# Patient Record
Sex: FEMALE | Race: WHITE | NOT HISPANIC OR LATINO | Employment: PART TIME | URBAN - METROPOLITAN AREA
[De-identification: names, ages, dates, MRNs, and addresses within clinical notes are randomized per-mention and may not be internally consistent; named-entity substitution may affect disease eponyms.]

---

## 2017-01-05 ENCOUNTER — HOSPITAL ENCOUNTER (OUTPATIENT)
Dept: MAMMOGRAPHY | Facility: HOSPITAL | Age: 57
Discharge: HOME | End: 2017-01-05
Attending: FAMILY MEDICINE

## 2017-01-05 ENCOUNTER — COMMUNICATION - HEALTHEAST (OUTPATIENT)
Dept: FAMILY MEDICINE | Facility: CLINIC | Age: 57
End: 2017-01-05

## 2017-01-05 DIAGNOSIS — Z12.31 VISIT FOR SCREENING MAMMOGRAM: ICD-10-CM

## 2017-01-05 DIAGNOSIS — G43.909 MIGRAINE: ICD-10-CM

## 2017-01-18 ENCOUNTER — RECORDS - HEALTHEAST (OUTPATIENT)
Dept: ADMINISTRATIVE | Facility: OTHER | Age: 57
End: 2017-01-18

## 2017-01-20 ENCOUNTER — RECORDS - HEALTHEAST (OUTPATIENT)
Dept: ADMINISTRATIVE | Facility: OTHER | Age: 57
End: 2017-01-20

## 2017-01-20 ENCOUNTER — RECORDS - HEALTHEAST (OUTPATIENT)
Dept: BONE DENSITY | Facility: CLINIC | Age: 57
End: 2017-01-20

## 2017-01-20 DIAGNOSIS — Z78.0 ASYMPTOMATIC MENOPAUSAL STATE: ICD-10-CM

## 2017-01-20 DIAGNOSIS — E07.9 DISORDER OF THYROID, UNSPECIFIED: ICD-10-CM

## 2017-03-10 ENCOUNTER — COMMUNICATION - HEALTHEAST (OUTPATIENT)
Dept: FAMILY MEDICINE | Facility: CLINIC | Age: 57
End: 2017-03-10

## 2017-03-10 DIAGNOSIS — E03.9 HYPOTHYROIDISM: ICD-10-CM

## 2017-05-02 ENCOUNTER — RECORDS - HEALTHEAST (OUTPATIENT)
Dept: ADMINISTRATIVE | Facility: OTHER | Age: 57
End: 2017-05-02

## 2017-08-07 ENCOUNTER — COMMUNICATION - HEALTHEAST (OUTPATIENT)
Dept: FAMILY MEDICINE | Facility: CLINIC | Age: 57
End: 2017-08-07

## 2017-08-24 ENCOUNTER — RECORDS - HEALTHEAST (OUTPATIENT)
Dept: ADMINISTRATIVE | Facility: OTHER | Age: 57
End: 2017-08-24

## 2017-11-19 ENCOUNTER — COMMUNICATION - HEALTHEAST (OUTPATIENT)
Dept: FAMILY MEDICINE | Facility: CLINIC | Age: 57
End: 2017-11-19

## 2017-11-19 DIAGNOSIS — E03.9 HYPOTHYROIDISM: ICD-10-CM

## 2017-11-21 ENCOUNTER — COMMUNICATION - HEALTHEAST (OUTPATIENT)
Dept: FAMILY MEDICINE | Facility: CLINIC | Age: 57
End: 2017-11-21

## 2017-12-21 ENCOUNTER — OFFICE VISIT - HEALTHEAST (OUTPATIENT)
Dept: FAMILY MEDICINE | Facility: CLINIC | Age: 57
End: 2017-12-21

## 2017-12-21 DIAGNOSIS — E04.2 NONTOXIC MULTINODULAR GOITER: ICD-10-CM

## 2017-12-21 DIAGNOSIS — Z00.00 ROUTINE GENERAL MEDICAL EXAMINATION AT A HEALTH CARE FACILITY: ICD-10-CM

## 2017-12-21 DIAGNOSIS — E55.9 VITAMIN D DEFICIENCY: ICD-10-CM

## 2017-12-21 DIAGNOSIS — G43.909 MIGRAINES: ICD-10-CM

## 2017-12-21 DIAGNOSIS — E80.6 DISORDER OF BILIRUBIN EXCRETION: ICD-10-CM

## 2017-12-21 DIAGNOSIS — E06.3 HASHIMOTO'S DISEASE: ICD-10-CM

## 2017-12-21 LAB
CHOLEST SERPL-MCNC: 235 MG/DL
FASTING STATUS PATIENT QL REPORTED: YES
HDLC SERPL-MCNC: 106 MG/DL
LDLC SERPL CALC-MCNC: 119 MG/DL
TRIGL SERPL-MCNC: 52 MG/DL

## 2017-12-21 ASSESSMENT — MIFFLIN-ST. JEOR: SCORE: 1119.29

## 2018-01-02 ENCOUNTER — COMMUNICATION - HEALTHEAST (OUTPATIENT)
Dept: FAMILY MEDICINE | Facility: CLINIC | Age: 58
End: 2018-01-02

## 2018-02-02 ENCOUNTER — COMMUNICATION - HEALTHEAST (OUTPATIENT)
Dept: FAMILY MEDICINE | Facility: CLINIC | Age: 58
End: 2018-02-02

## 2018-02-02 ENCOUNTER — HOSPITAL ENCOUNTER (OUTPATIENT)
Dept: MAMMOGRAPHY | Facility: HOSPITAL | Age: 58
Discharge: HOME | End: 2018-02-02
Attending: FAMILY MEDICINE

## 2018-02-02 DIAGNOSIS — G43.909 MIGRAINE: ICD-10-CM

## 2018-02-02 DIAGNOSIS — Z12.31 VISIT FOR SCREENING MAMMOGRAM: ICD-10-CM

## 2018-03-05 ENCOUNTER — COMMUNICATION - HEALTHEAST (OUTPATIENT)
Dept: FAMILY MEDICINE | Facility: CLINIC | Age: 58
End: 2018-03-05

## 2018-03-05 DIAGNOSIS — E03.9 HYPOTHYROIDISM: ICD-10-CM

## 2018-11-27 ENCOUNTER — COMMUNICATION - HEALTHEAST (OUTPATIENT)
Dept: FAMILY MEDICINE | Facility: CLINIC | Age: 58
End: 2018-11-27

## 2018-11-27 DIAGNOSIS — E03.9 HYPOTHYROIDISM: ICD-10-CM

## 2019-01-16 ENCOUNTER — OFFICE VISIT - HEALTHEAST (OUTPATIENT)
Dept: FAMILY MEDICINE | Facility: CLINIC | Age: 59
End: 2019-01-16

## 2019-01-16 DIAGNOSIS — E55.9 VITAMIN D DEFICIENCY: ICD-10-CM

## 2019-01-16 DIAGNOSIS — E04.2 NONTOXIC MULTINODULAR GOITER: ICD-10-CM

## 2019-01-16 DIAGNOSIS — E03.9 HYPOTHYROIDISM: ICD-10-CM

## 2019-01-16 DIAGNOSIS — G43.909 MIGRAINE WITHOUT STATUS MIGRAINOSUS, NOT INTRACTABLE, UNSPECIFIED MIGRAINE TYPE: ICD-10-CM

## 2019-01-16 DIAGNOSIS — Z00.00 ROUTINE GENERAL MEDICAL EXAMINATION AT A HEALTH CARE FACILITY: ICD-10-CM

## 2019-01-16 DIAGNOSIS — E80.6 DISORDER OF BILIRUBIN EXCRETION: ICD-10-CM

## 2019-01-16 LAB
CHOLEST SERPL-MCNC: 224 MG/DL
FASTING STATUS PATIENT QL REPORTED: YES
FASTING STATUS PATIENT QL REPORTED: YES
GLUCOSE BLD-MCNC: 87 MG/DL (ref 70–125)
HDLC SERPL-MCNC: 111 MG/DL
LDLC SERPL CALC-MCNC: 100 MG/DL
TRIGL SERPL-MCNC: 64 MG/DL
TSH SERPL DL<=0.005 MIU/L-ACNC: 0.64 UIU/ML (ref 0.3–5)

## 2019-01-17 LAB
25(OH)D3 SERPL-MCNC: 39.3 NG/ML (ref 30–80)
25(OH)D3 SERPL-MCNC: 39.3 NG/ML (ref 30–80)

## 2019-01-24 ENCOUNTER — COMMUNICATION - HEALTHEAST (OUTPATIENT)
Dept: FAMILY MEDICINE | Facility: CLINIC | Age: 59
End: 2019-01-24

## 2019-02-14 ENCOUNTER — HOSPITAL ENCOUNTER (OUTPATIENT)
Dept: MAMMOGRAPHY | Facility: CLINIC | Age: 59
Discharge: HOME | End: 2019-02-14
Attending: FAMILY MEDICINE

## 2019-02-14 DIAGNOSIS — Z12.31 VISIT FOR SCREENING MAMMOGRAM: ICD-10-CM

## 2019-02-18 ENCOUNTER — COMMUNICATION - HEALTHEAST (OUTPATIENT)
Dept: FAMILY MEDICINE | Facility: CLINIC | Age: 59
End: 2019-02-18

## 2019-02-18 DIAGNOSIS — G43.909 MIGRAINE: ICD-10-CM

## 2019-09-10 ENCOUNTER — COMMUNICATION - HEALTHEAST (OUTPATIENT)
Dept: SCHEDULING | Facility: CLINIC | Age: 59
End: 2019-09-10

## 2019-09-10 ENCOUNTER — OFFICE VISIT - HEALTHEAST (OUTPATIENT)
Dept: FAMILY MEDICINE | Facility: CLINIC | Age: 59
End: 2019-09-10

## 2019-09-10 DIAGNOSIS — R42 LIGHTHEADEDNESS: ICD-10-CM

## 2019-09-10 DIAGNOSIS — R06.09 DOE (DYSPNEA ON EXERTION): ICD-10-CM

## 2019-09-10 DIAGNOSIS — R42 DIZZINESS: ICD-10-CM

## 2019-09-10 DIAGNOSIS — E03.9 HYPOTHYROIDISM, UNSPECIFIED TYPE: ICD-10-CM

## 2019-09-10 DIAGNOSIS — R06.09 OTHER FORMS OF DYSPNEA: ICD-10-CM

## 2019-09-10 DIAGNOSIS — G62.9 PERIPHERAL POLYNEUROPATHY: ICD-10-CM

## 2019-09-10 DIAGNOSIS — D64.9 ANEMIA, UNSPECIFIED TYPE: ICD-10-CM

## 2019-09-10 LAB
ALBUMIN SERPL-MCNC: 4.2 G/DL (ref 3.5–5)
ALP SERPL-CCNC: 77 U/L (ref 45–120)
ALT SERPL W P-5'-P-CCNC: <9 U/L (ref 0–45)
ANION GAP SERPL CALCULATED.3IONS-SCNC: 9 MMOL/L (ref 5–18)
AST SERPL W P-5'-P-CCNC: 15 U/L (ref 0–40)
BASOPHILS # BLD AUTO: 0 THOU/UL (ref 0–0.2)
BASOPHILS NFR BLD AUTO: 1 % (ref 0–2)
BILIRUB SERPL-MCNC: 0.9 MG/DL (ref 0–1)
BUN SERPL-MCNC: 14 MG/DL (ref 8–22)
CALCIUM SERPL-MCNC: 9.5 MG/DL (ref 8.5–10.5)
CHLORIDE BLD-SCNC: 103 MMOL/L (ref 98–107)
CO2 SERPL-SCNC: 26 MMOL/L (ref 22–31)
CREAT SERPL-MCNC: 0.84 MG/DL (ref 0.6–1.1)
EOSINOPHIL # BLD AUTO: 0.3 THOU/UL (ref 0–0.4)
EOSINOPHIL NFR BLD AUTO: 4 % (ref 0–6)
ERYTHROCYTE [DISTWIDTH] IN BLOOD BY AUTOMATED COUNT: 13.7 % (ref 11–14.5)
GFR SERPL CREATININE-BSD FRML MDRD: >60 ML/MIN/1.73M2
GLUCOSE BLD-MCNC: 98 MG/DL (ref 70–125)
HCT VFR BLD AUTO: 32.9 % (ref 35–47)
HGB BLD-MCNC: 11.1 G/DL (ref 12–16)
LYMPHOCYTES # BLD AUTO: 2.6 THOU/UL (ref 0.8–4.4)
LYMPHOCYTES NFR BLD AUTO: 35 % (ref 20–40)
MCH RBC QN AUTO: 27.1 PG (ref 27–34)
MCHC RBC AUTO-ENTMCNC: 33.8 G/DL (ref 32–36)
MCV RBC AUTO: 80 FL (ref 80–100)
MONOCYTES # BLD AUTO: 0.4 THOU/UL (ref 0–0.9)
MONOCYTES NFR BLD AUTO: 6 % (ref 2–10)
NEUTROPHILS # BLD AUTO: 4 THOU/UL (ref 2–7.7)
NEUTROPHILS NFR BLD AUTO: 55 % (ref 50–70)
PLATELET # BLD AUTO: 237 THOU/UL (ref 140–440)
PMV BLD AUTO: 7.9 FL (ref 7–10)
POTASSIUM BLD-SCNC: 3.8 MMOL/L (ref 3.5–5)
PROT SERPL-MCNC: 7.2 G/DL (ref 6–8)
RBC # BLD AUTO: 4.11 MILL/UL (ref 3.8–5.4)
SODIUM SERPL-SCNC: 138 MMOL/L (ref 136–145)
TSH SERPL DL<=0.005 MIU/L-ACNC: 1.28 UIU/ML (ref 0.3–5)
VIT B12 SERPL-MCNC: 440 PG/ML (ref 213–816)
WBC: 7.2 THOU/UL (ref 4–11)

## 2019-09-11 LAB
ATRIAL RATE - MUSE: 60 BPM
DIASTOLIC BLOOD PRESSURE - MUSE: NORMAL MMHG
FERRITIN SERPL-MCNC: 4 NG/ML (ref 10–130)
INTERPRETATION ECG - MUSE: NORMAL
IRON SATN MFR SERPL: 5 % (ref 20–50)
IRON SERPL-MCNC: 20 UG/DL (ref 42–175)
P AXIS - MUSE: 32 DEGREES
PR INTERVAL - MUSE: 168 MS
QRS DURATION - MUSE: 80 MS
QT - MUSE: 446 MS
QTC - MUSE: 446 MS
R AXIS - MUSE: 19 DEGREES
SYSTOLIC BLOOD PRESSURE - MUSE: NORMAL MMHG
T AXIS - MUSE: 24 DEGREES
TIBC SERPL-MCNC: 415 UG/DL (ref 313–563)
TRANSFERRIN SERPL-MCNC: 332 MG/DL (ref 212–360)
VENTRICULAR RATE- MUSE: 60 BPM

## 2019-09-20 ENCOUNTER — AMBULATORY - HEALTHEAST (OUTPATIENT)
Dept: FAMILY MEDICINE | Facility: CLINIC | Age: 59
End: 2019-09-20

## 2019-09-20 DIAGNOSIS — D50.9 IRON DEFICIENCY ANEMIA, UNSPECIFIED IRON DEFICIENCY ANEMIA TYPE: ICD-10-CM

## 2019-09-20 RX ORDER — FERROUS SULFATE 325(65) MG
1 TABLET ORAL 2 TIMES DAILY
Qty: 60 TABLET | Refills: 3 | Status: SHIPPED | OUTPATIENT
Start: 2019-09-20 | End: 2023-03-29

## 2019-10-01 ENCOUNTER — COMMUNICATION - HEALTHEAST (OUTPATIENT)
Dept: FAMILY MEDICINE | Facility: CLINIC | Age: 59
End: 2019-10-01

## 2019-10-13 ENCOUNTER — COMMUNICATION - HEALTHEAST (OUTPATIENT)
Dept: FAMILY MEDICINE | Facility: CLINIC | Age: 59
End: 2019-10-13

## 2020-01-11 ENCOUNTER — COMMUNICATION - HEALTHEAST (OUTPATIENT)
Dept: FAMILY MEDICINE | Facility: CLINIC | Age: 60
End: 2020-01-11

## 2020-01-11 DIAGNOSIS — E03.9 HYPOTHYROIDISM: ICD-10-CM

## 2020-02-05 ENCOUNTER — RECORDS - HEALTHEAST (OUTPATIENT)
Dept: ADMINISTRATIVE | Facility: OTHER | Age: 60
End: 2020-02-05

## 2020-02-05 ENCOUNTER — OFFICE VISIT - HEALTHEAST (OUTPATIENT)
Dept: FAMILY MEDICINE | Facility: CLINIC | Age: 60
End: 2020-02-05

## 2020-02-05 ENCOUNTER — TRANSFERRED RECORDS (OUTPATIENT)
Dept: MULTI SPECIALTY CLINIC | Facility: CLINIC | Age: 60
End: 2020-02-05

## 2020-02-05 DIAGNOSIS — E06.3 HYPOTHYROIDISM DUE TO HASHIMOTO'S THYROIDITIS: ICD-10-CM

## 2020-02-05 DIAGNOSIS — D50.8 OTHER IRON DEFICIENCY ANEMIA: ICD-10-CM

## 2020-02-05 DIAGNOSIS — E55.9 VITAMIN D DEFICIENCY: ICD-10-CM

## 2020-02-05 DIAGNOSIS — E04.2 NONTOXIC MULTINODULAR GOITER: ICD-10-CM

## 2020-02-05 DIAGNOSIS — Z00.00 ROUTINE GENERAL MEDICAL EXAMINATION AT A HEALTH CARE FACILITY: ICD-10-CM

## 2020-02-05 DIAGNOSIS — G43.909 MIGRAINE WITHOUT STATUS MIGRAINOSUS, NOT INTRACTABLE, UNSPECIFIED MIGRAINE TYPE: ICD-10-CM

## 2020-02-05 DIAGNOSIS — Z85.828 HISTORY OF BASAL CELL CARCINOMA: ICD-10-CM

## 2020-02-05 LAB
CHOLEST SERPL-MCNC: 225 MG/DL
ERYTHROCYTE [DISTWIDTH] IN BLOOD BY AUTOMATED COUNT: 12.3 % (ref 11–14.5)
FASTING STATUS PATIENT QL REPORTED: YES
FASTING STATUS PATIENT QL REPORTED: YES
FERRITIN SERPL-MCNC: 45 NG/ML (ref 10–130)
GLUCOSE BLD-MCNC: 81 MG/DL (ref 70–125)
HCT VFR BLD AUTO: 41 % (ref 35–47)
HDLC SERPL-MCNC: 83 MG/DL
HGB BLD-MCNC: 14.2 G/DL (ref 12–16)
HIV 1+2 AB+HIV1 P24 AG SERPL QL IA: NEGATIVE
HPV ABSTRACT: NORMAL
LDLC SERPL CALC-MCNC: 124 MG/DL
MCH RBC QN AUTO: 30.1 PG (ref 27–34)
MCHC RBC AUTO-ENTMCNC: 34.7 G/DL (ref 32–36)
MCV RBC AUTO: 87 FL (ref 80–100)
PLATELET # BLD AUTO: 205 THOU/UL (ref 140–440)
PMV BLD AUTO: 8.2 FL (ref 7–10)
RBC # BLD AUTO: 4.72 MILL/UL (ref 3.8–5.4)
TRIGL SERPL-MCNC: 88 MG/DL
TSH SERPL DL<=0.005 MIU/L-ACNC: 0.9 UIU/ML (ref 0.3–5)
WBC: 9.4 THOU/UL (ref 4–11)

## 2020-02-05 ASSESSMENT — MIFFLIN-ST. JEOR: SCORE: 1115.66

## 2020-02-06 LAB
25(OH)D3 SERPL-MCNC: 41.1 NG/ML (ref 30–80)
25(OH)D3 SERPL-MCNC: 41.1 NG/ML (ref 30–80)
HCV AB SERPL QL IA: NEGATIVE

## 2020-02-21 ENCOUNTER — RECORDS - HEALTHEAST (OUTPATIENT)
Dept: ADMINISTRATIVE | Facility: OTHER | Age: 60
End: 2020-02-21

## 2020-03-09 ENCOUNTER — RECORDS - HEALTHEAST (OUTPATIENT)
Dept: HEALTH INFORMATION MANAGEMENT | Facility: CLINIC | Age: 60
End: 2020-03-09

## 2020-06-23 ENCOUNTER — COMMUNICATION - HEALTHEAST (OUTPATIENT)
Dept: FAMILY MEDICINE | Facility: CLINIC | Age: 60
End: 2020-06-23

## 2020-06-23 DIAGNOSIS — G43.909 MIGRAINE: ICD-10-CM

## 2020-06-23 RX ORDER — RIZATRIPTAN BENZOATE 10 MG/1
TABLET ORAL
Qty: 12 TABLET | Refills: 3 | Status: SHIPPED | OUTPATIENT
Start: 2020-06-23 | End: 2021-07-30

## 2020-08-13 ENCOUNTER — OFFICE VISIT - HEALTHEAST (OUTPATIENT)
Dept: FAMILY MEDICINE | Facility: CLINIC | Age: 60
End: 2020-08-13

## 2020-08-13 ENCOUNTER — COMMUNICATION - HEALTHEAST (OUTPATIENT)
Dept: FAMILY MEDICINE | Facility: CLINIC | Age: 60
End: 2020-08-13

## 2020-08-13 DIAGNOSIS — T63.441A BEE STING REACTION, ACCIDENTAL OR UNINTENTIONAL, INITIAL ENCOUNTER: ICD-10-CM

## 2020-08-13 RX ORDER — PREDNISONE 20 MG/1
TABLET ORAL
Qty: 7 TABLET | Refills: 0 | Status: SHIPPED | OUTPATIENT
Start: 2020-08-13 | End: 2021-08-19

## 2021-02-09 ENCOUNTER — RECORDS - HEALTHEAST (OUTPATIENT)
Dept: ADMINISTRATIVE | Facility: OTHER | Age: 61
End: 2021-02-09

## 2021-02-09 ENCOUNTER — COMMUNICATION - HEALTHEAST (OUTPATIENT)
Dept: ADMINISTRATIVE | Facility: CLINIC | Age: 61
End: 2021-02-09

## 2021-02-11 ENCOUNTER — TRANSFERRED RECORDS (OUTPATIENT)
Dept: MULTI SPECIALTY CLINIC | Facility: CLINIC | Age: 61
End: 2021-02-11

## 2021-02-11 LAB — PAP SMEAR - HIM PATIENT REPORTED: NEGATIVE

## 2021-02-17 ENCOUNTER — OFFICE VISIT - HEALTHEAST (OUTPATIENT)
Dept: FAMILY MEDICINE | Facility: CLINIC | Age: 61
End: 2021-02-17

## 2021-02-17 DIAGNOSIS — E06.3 HYPOTHYROIDISM DUE TO HASHIMOTO'S THYROIDITIS: ICD-10-CM

## 2021-02-17 DIAGNOSIS — Z85.828 HISTORY OF BASAL CELL CARCINOMA: ICD-10-CM

## 2021-02-17 DIAGNOSIS — G43.909 MIGRAINE WITHOUT STATUS MIGRAINOSUS, NOT INTRACTABLE, UNSPECIFIED MIGRAINE TYPE: ICD-10-CM

## 2021-02-17 DIAGNOSIS — D50.8 OTHER IRON DEFICIENCY ANEMIA: ICD-10-CM

## 2021-02-17 DIAGNOSIS — E80.6 DISORDER OF BILIRUBIN EXCRETION: ICD-10-CM

## 2021-02-17 DIAGNOSIS — E55.9 VITAMIN D DEFICIENCY: ICD-10-CM

## 2021-02-17 DIAGNOSIS — E04.2 NONTOXIC MULTINODULAR GOITER: ICD-10-CM

## 2021-02-17 DIAGNOSIS — Z12.11 SCREEN FOR COLON CANCER: ICD-10-CM

## 2021-02-17 DIAGNOSIS — M85.88 OSTEOPENIA OF SPINE: ICD-10-CM

## 2021-02-17 DIAGNOSIS — Z00.00 ROUTINE GENERAL MEDICAL EXAMINATION AT A HEALTH CARE FACILITY: ICD-10-CM

## 2021-02-17 LAB
ALBUMIN SERPL-MCNC: 4.2 G/DL (ref 3.5–5)
ANION GAP SERPL CALCULATED.3IONS-SCNC: 8 MMOL/L (ref 5–18)
BUN SERPL-MCNC: 13 MG/DL (ref 8–22)
CALCIUM SERPL-MCNC: 9.1 MG/DL (ref 8.5–10.5)
CHLORIDE BLD-SCNC: 106 MMOL/L (ref 98–107)
CHOLEST SERPL-MCNC: 209 MG/DL
CO2 SERPL-SCNC: 26 MMOL/L (ref 22–31)
CREAT SERPL-MCNC: 0.79 MG/DL (ref 0.6–1.1)
ERYTHROCYTE [DISTWIDTH] IN BLOOD BY AUTOMATED COUNT: 12.8 % (ref 11–14.5)
FASTING STATUS PATIENT QL REPORTED: YES
FERRITIN SERPL-MCNC: 79 NG/ML (ref 10–130)
GFR SERPL CREATININE-BSD FRML MDRD: >60 ML/MIN/1.73M2
GLUCOSE BLD-MCNC: 93 MG/DL (ref 70–125)
HCT VFR BLD AUTO: 40.8 % (ref 35–47)
HDLC SERPL-MCNC: 83 MG/DL
HGB BLD-MCNC: 13.6 G/DL (ref 12–16)
LDLC SERPL CALC-MCNC: 109 MG/DL
MCH RBC QN AUTO: 29.4 PG (ref 27–34)
MCHC RBC AUTO-ENTMCNC: 33.3 G/DL (ref 32–36)
MCV RBC AUTO: 88 FL (ref 80–100)
PHOSPHATE SERPL-MCNC: 3.3 MG/DL (ref 2.5–4.5)
PLATELET # BLD AUTO: 213 THOU/UL (ref 140–440)
PMV BLD AUTO: 9.4 FL (ref 7–10)
POTASSIUM BLD-SCNC: 4.5 MMOL/L (ref 3.5–5)
PTH-INTACT SERPL-MCNC: 44 PG/ML (ref 10–86)
RBC # BLD AUTO: 4.62 MILL/UL (ref 3.8–5.4)
SODIUM SERPL-SCNC: 140 MMOL/L (ref 136–145)
TRIGL SERPL-MCNC: 83 MG/DL
TSH SERPL DL<=0.005 MIU/L-ACNC: 2.69 UIU/ML (ref 0.3–5)
WBC: 5.6 THOU/UL (ref 4–11)

## 2021-02-17 ASSESSMENT — MIFFLIN-ST. JEOR: SCORE: 1119.52

## 2021-02-18 LAB
25(OH)D3 SERPL-MCNC: 58.7 NG/ML (ref 30–80)
25(OH)D3 SERPL-MCNC: 58.7 NG/ML (ref 30–80)

## 2021-02-19 ENCOUNTER — RECORDS - HEALTHEAST (OUTPATIENT)
Dept: ADMINISTRATIVE | Facility: OTHER | Age: 61
End: 2021-02-19

## 2021-02-22 ENCOUNTER — AMBULATORY - HEALTHEAST (OUTPATIENT)
Dept: MULTI SPECIALTY CLINIC | Facility: CLINIC | Age: 61
End: 2021-02-22

## 2021-02-22 LAB — COLOGUARD-ABSTRACT: NEGATIVE

## 2021-03-12 ENCOUNTER — COMMUNICATION - HEALTHEAST (OUTPATIENT)
Dept: FAMILY MEDICINE | Facility: CLINIC | Age: 61
End: 2021-03-12

## 2021-04-14 ENCOUNTER — COMMUNICATION - HEALTHEAST (OUTPATIENT)
Dept: FAMILY MEDICINE | Facility: CLINIC | Age: 61
End: 2021-04-14

## 2021-04-14 DIAGNOSIS — E06.3 HYPOTHYROIDISM DUE TO HASHIMOTO'S THYROIDITIS: ICD-10-CM

## 2021-04-16 RX ORDER — LEVOTHYROXINE SODIUM 75 UG/1
75 TABLET ORAL DAILY
Qty: 90 TABLET | Refills: 3 | Status: SHIPPED | OUTPATIENT
Start: 2021-04-16 | End: 2022-03-21

## 2021-05-24 ENCOUNTER — RECORDS - HEALTHEAST (OUTPATIENT)
Dept: ADMINISTRATIVE | Facility: CLINIC | Age: 61
End: 2021-05-24

## 2021-05-25 ENCOUNTER — RECORDS - HEALTHEAST (OUTPATIENT)
Dept: ADMINISTRATIVE | Facility: CLINIC | Age: 61
End: 2021-05-25

## 2021-05-26 ENCOUNTER — RECORDS - HEALTHEAST (OUTPATIENT)
Dept: ADMINISTRATIVE | Facility: CLINIC | Age: 61
End: 2021-05-26

## 2021-05-27 ENCOUNTER — RECORDS - HEALTHEAST (OUTPATIENT)
Dept: ADMINISTRATIVE | Facility: CLINIC | Age: 61
End: 2021-05-27

## 2021-05-28 ENCOUNTER — RECORDS - HEALTHEAST (OUTPATIENT)
Dept: ADMINISTRATIVE | Facility: CLINIC | Age: 61
End: 2021-05-28

## 2021-05-29 ENCOUNTER — RECORDS - HEALTHEAST (OUTPATIENT)
Dept: ADMINISTRATIVE | Facility: CLINIC | Age: 61
End: 2021-05-29

## 2021-05-31 ENCOUNTER — RECORDS - HEALTHEAST (OUTPATIENT)
Dept: ADMINISTRATIVE | Facility: CLINIC | Age: 61
End: 2021-05-31

## 2021-05-31 VITALS — HEIGHT: 63 IN | BODY MASS INDEX: 22.47 KG/M2 | WEIGHT: 126.8 LBS

## 2021-06-01 ENCOUNTER — RECORDS - HEALTHEAST (OUTPATIENT)
Dept: ADMINISTRATIVE | Facility: CLINIC | Age: 61
End: 2021-06-01

## 2021-06-01 ENCOUNTER — COMMUNICATION - HEALTHEAST (OUTPATIENT)
Dept: FAMILY MEDICINE | Facility: CLINIC | Age: 61
End: 2021-06-01

## 2021-06-01 DIAGNOSIS — T75.3XXA MOTION SICKNESS, INITIAL ENCOUNTER: ICD-10-CM

## 2021-06-01 NOTE — TELEPHONE ENCOUNTER
Having light headedness, shortness of breath going up stairs, and both arms tingling into hands (ring and pinky finger).  Has been  Under a lot of stress.  Not normal for her.    Denies chest pain but has been doing weights and has soreness from that.  Heavy feeling for a while.    Does not feel like she will pass out.    Has issues with low BP.    Will drink at least a glass of water to help BP.    Patient would like to be seen.    Transferred to scheduling for an appointment for today.    Chanelle Farah, RN, [facility] Nurse Triage/Med Refills RN     Reason for Disposition    Patient wants to be seen    Protocols used: DIZZINESS-A-OH

## 2021-06-01 NOTE — PROGRESS NOTES
Patient ID: Bee Schulz is a 59 y.o. female.  /62   Pulse 71   Wt 125 lb 3.2 oz (56.8 kg)   LMP 12/23/2012   SpO2 97%   BMI 22.18 kg/m      Assessment/Plan:                   Diagnoses and all orders for this visit:    Dizziness  -     Ferritin  -     Iron and Transferrin Iron Binding Capacity    RODRIGUEZ (dyspnea on exertion)  -     Comprehensive Metabolic Panel  -     HM1(CBC and Differential)  -     Electrocardiogram Perform and Read  -     XR Chest 2 Views  -     HM1 (CBC with Diff)  -     Ferritin  -     Iron and Transferrin Iron Binding Capacity    Lightheadedness  -     Comprehensive Metabolic Panel  -     HM1(CBC and Differential)  -     Electrocardiogram Perform and Read  -     HM1 (CBC with Diff)  -     Ferritin  -     Iron and Transferrin Iron Binding Capacity    Peripheral polyneuropathy  -     Vitamin B12  -     Ferritin  -     Iron and Transferrin Iron Binding Capacity    Hypothyroidism, unspecified type  -     Thyroid Cascade  -     Ferritin  -     Iron and Transferrin Iron Binding Capacity    Anemia, unspecified type  -     Cancel: Iron and Transferrin Iron Binding Capacity  -     Cancel: Ferritin  -     Ferritin  -     Iron and Transferrin Iron Binding Capacity    Other orders  -     Cancel: Electrocardiogram Perform and Read           DISCUSSION  Test performed include chest x-ray and EKG.  Chest x-ray shows no abnormal any.  EKG shows normal sinus rhythm without any ischemic concerns.  Hemogram shows a mild need discussed obtaining some additional labs and making further recommendations based upon that.  Discussed finding ways to manage stress as well as maintaining hydration.  Likely patient will need additional testing and perhaps further exploration of some of her symptoms which potentially could be related to stress but certainly based on description could be related to multiple other aspects of pathology.  In terms of severe concerns such as coronary vascular disease, neurovascular  disease or other more significant things based on her clinical description, my exam findings on these preliminary tests that were obtained today I do not feel that there is a significant life-threatening process.  Subjective:     GLENYS Schulz is a 59 y.o. female to discuss a multitude of symptoms.  Patient reports that she has felt somewhat dizzy on and off for the past 2 weeks but it seems to be worsening.  She has a hard time describing this dizziness.  It is not clearly vertiginous sensation or a presyncopal type sensation.  In addition patient reports tingling.  This occurs in the arms primarily.  She also has some sensation in the feet and legs.  This is intermittent.  It involves both sides of her body.  Upon further questioning she also reports that she has shortness of breath with activity.  This is somewhat nonspecific.  She denies chest pain associated with activity.  Patient does admit to being under a significant amount of stress.  Patient does have underlying hypothyroidism.    Review of Systems  Complete review of systems is obtained.  Other than the specific considerations noted above complete review of systems is negative.          Objective:   Medications:  Current Outpatient Medications   Medication Sig     levothyroxine (SYNTHROID, LEVOTHROID) 75 MCG tablet Take 1 tablet (75 mcg total) by mouth Daily at 6:00 am.     aspirin-acetaminophen-caffeine (EXCEDRIN MIGRAINE) 250-250-65 mg per tablet Take 1 tablet by mouth every 6 (six) hours as needed for pain.     calcium carbonate (OS-CRAIG) 600 mg (1,500 mg) tablet Take 600 mg by mouth daily. Plus D 400mg     cholecalciferol, vitamin D3, (VITAMIN D3) 2,000 unit cap Take 2,000 Units by mouth daily.     glucosamine-chondroitin 500-400 mg tablet Take 1 tablet by mouth 3 (three) times a day.     magnesium 30 mg tablet Take 250 mg by mouth daily.     naproxen (NAPROSYN) 500 MG tablet Take 500 mg by mouth 2 (two) times a day with meals.      OMEGA-3/DHA/EPA/FISH OIL (FISH OIL-OMEGA-3 FATTY ACIDS) 300-1,000 mg capsule Take 1,200 mg by mouth daily. Takes when needed     rizatriptan (MAXALT) 10 MG tablet Take 1 tablet (10mg) by mouth at headache onset, may repeat in 2 (two) hours if needed (max 30mg/24hrs)       Allergies:  Allergies   Allergen Reactions     Penicillins      Sulfa (Sulfonamide Antibiotics) Rash       Tobacco:   reports that she has quit smoking. She has never used smokeless tobacco.     Physical Exam          /62   Pulse 71   Wt 125 lb 3.2 oz (56.8 kg)   LMP 12/23/2012   SpO2 97%   BMI 22.18 kg/m          General Appearance:    Alert, cooperative, no distress   Eyes:   No scleral icterus or conjunctival irritation       Ears:    Normal TM's and external ear canals, both ears   Throat:   Lips, mucosa, and tongue normal; teeth and gums normal   Neck:   Supple, symmetrical, trachea midline, no adenopathy;        thyroid:  No enlargement/tenderness/nodules   Lungs:     Clear to auscultation bilaterally, respirations unlabored, no wheezes or crackles   Heart:    Regular rate and rhythm,  No murmur   Abdomen:    Soft, no distention, no tenderness on palpation, no masses, no organomegaly     Extremities:  No edema, no joint swelling or redness, no evidence of any injuries   Skin:  No concerning skin findings, no suspicious moles, no rashes   Neurologic:  On gross examination there is no motor or sensory deficit.  Patient walks with a normal gait

## 2021-06-02 ENCOUNTER — RECORDS - HEALTHEAST (OUTPATIENT)
Dept: ADMINISTRATIVE | Facility: CLINIC | Age: 61
End: 2021-06-02

## 2021-06-03 VITALS
HEART RATE: 71 BPM | BODY MASS INDEX: 22.18 KG/M2 | WEIGHT: 125.2 LBS | DIASTOLIC BLOOD PRESSURE: 62 MMHG | OXYGEN SATURATION: 97 % | SYSTOLIC BLOOD PRESSURE: 114 MMHG

## 2021-06-04 VITALS
WEIGHT: 127 LBS | HEART RATE: 68 BPM | OXYGEN SATURATION: 97 % | SYSTOLIC BLOOD PRESSURE: 105 MMHG | BODY MASS INDEX: 22.5 KG/M2 | DIASTOLIC BLOOD PRESSURE: 68 MMHG | RESPIRATION RATE: 16 BRPM

## 2021-06-04 VITALS
WEIGHT: 126 LBS | HEART RATE: 65 BPM | SYSTOLIC BLOOD PRESSURE: 98 MMHG | DIASTOLIC BLOOD PRESSURE: 62 MMHG | BODY MASS INDEX: 22.32 KG/M2 | HEIGHT: 63 IN | OXYGEN SATURATION: 99 %

## 2021-06-05 VITALS
OXYGEN SATURATION: 99 % | BODY MASS INDEX: 22.79 KG/M2 | TEMPERATURE: 98 F | DIASTOLIC BLOOD PRESSURE: 64 MMHG | WEIGHT: 128.6 LBS | RESPIRATION RATE: 20 BRPM | HEIGHT: 63 IN | SYSTOLIC BLOOD PRESSURE: 104 MMHG | HEART RATE: 67 BPM

## 2021-06-05 NOTE — PATIENT INSTRUCTIONS - HE
Consider Shingrix (the new shingles vaccine, 2 dose series) to reduce your risk of shingles.  Check your insurance coverage first.

## 2021-06-05 NOTE — TELEPHONE ENCOUNTER
Refill Approved    Rx renewed per Medication Renewal Policy. Medication was last renewed on 1/16/19.    Jania Venegas, [facility] Triage/Med Refill 1/14/2020     Requested Prescriptions   Pending Prescriptions Disp Refills     levothyroxine (SYNTHROID, LEVOTHROID) 75 MCG tablet [Pharmacy Med Name: LEVOTHYROXINE 0.075MG (75MCG) TABS] 90 tablet 3     Sig: TAKE 1 TABLET BY MOUTH DAILY AT 6AM.       Thyroid Hormones Protocol Passed - 1/11/2020  1:01 PM        Passed - Provider visit in past 12 months or next 3 months     Last office visit with prescriber/PCP: Visit date not found OR same dept: 9/10/2019 David Alarcon MD OR same specialty: 9/10/2019 David Alarcon MD  Last physical: 1/16/2019 Last MTM visit: Visit date not found   Next visit within 3 mo: Visit date not found  Next physical within 3 mo: Visit date not found  Prescriber OR PCP: Katina Alicia MD  Last diagnosis associated with med order: 1. Hypothyroidism  - levothyroxine (SYNTHROID, LEVOTHROID) 75 MCG tablet [Pharmacy Med Name: LEVOTHYROXINE 0.075MG (75MCG) TABS]; TAKE 1 TABLET BY MOUTH DAILY AT 6AM.  Dispense: 90 tablet; Refill: 3    If protocol passes may refill for 12 months if within 3 months of last provider visit (or a total of 15 months).             Passed - TSH on file in past 12 months for patient age 12 & older     TSH   Date Value Ref Range Status   09/10/2019 1.28 0.30 - 5.00 uIU/mL Final

## 2021-06-05 NOTE — PROGRESS NOTES
Assessment/Plan:     1. Routine general medical examination at a health care facility  Encouraged healthy lifestyle habits including regular exercise, healthy eating habits, and adequate calcium and vitamin D intake.  Will obtain fasting glucose to screen for diabetes.  Will obtain fasting lipids to screen for dyslipidemia.  Will check HIV and hepatitis C for infection screening purposes, she is low risk for both.  Release of information obtained for records from [facility] in regards to Pap smear obtained just today.  Up-to-date with mammography and colon cancer screening.  - Lipid Cascade FASTING  - HIV Antigen/Antibody Screening Cascade  - Hepatitis C Antibody (Anti-HCV)  - Glucose    2. Hypothyroidism due to Hashimoto's thyroiditis  Clinically euthyroid.  Will check thyroid cascade today.  Continue levothyroxine.  - Thyroid Cascade  - levothyroxine (SYNTHROID, LEVOTHROID) 75 MCG tablet; Take 1 tablet (75 mcg total) by mouth Daily at 6:00 am.  Dispense: 90 tablet; Refill: 4    3. Nontoxic multinodular goiter  Remains asymptomatic.    4. Vitamin D deficiency  She remains on a supplement.  Will check vitamin D level today.  - Vitamin D, Total (25-Hydroxy)    5. Migraine without status migrainosus, not intractable, unspecified migraine type  Chronic, stable on rizatriptan.    6. History of basal cell carcinoma  She is followed by dermatology.  Using sunscreen regularly.    7. Other iron deficiency anemia  This is related to frequent donation of blood.  We will check hemogram today.  If normal, she may continue to donate blood.  If she develops any symptoms with blood donation this time, then I recommend discontinuation in the future.  Will check ferritin level to assess iron stores as well.  Advised that she continue taking a daily iron supplement if she is going to continue donating blood.  - HM2(CBC w/o Differential)  - Ferritin     Patient Instructions   Consider Shingrix (the new shingles vaccine,  2 dose series) to reduce your risk of shingles.  Check your insurance coverage first.       Return in about 1 year (around 2/5/2021).          Subjective:     Bee Schulz is a 59 y.o. female who presents for an annual exam.  Overall has been doing well.  She was seen by her OB gynecologist at [facility] this morning, they evaluated some right breast pain and she will be having a mammogram and ultrasound to assess further.  She declines further evaluation of this today.  History of hypothyroidism due to Hashimoto's, remains on levothyroxine and feels in good balance.  History of nontoxic goiter, prior ultrasound several years ago showing all lesions less than 1 cm in size, this remains asymptomatic.  History of vitamin D deficiency this due for follow-up.  History of migraine headaches, these are stable.  Using Excedrin or rizatriptan as needed.  Seeing a dermatologist regularly due to previous history of basal cell carcinoma.    In the fall, she was seen by Dr. Alarcon due to dizziness and exertional shortness of breath.  Evaluation unremarkable with the exception of iron deficiency anemia.  Patient had just donated blood.  She began an iron supplement, able to tolerate only once daily due to constipation, also improving her diet especially 10.  Her symptoms have resolved.  Wondering about resuming blood donation, she is scheduled to do so in 2 days.    She is , 3 children.  Working as an .  Exercising regularly and feeling well with this.  Up to 8 alcoholic beverages per week, often none.  She does not smoke.    Healthy Habits:   Healthy Diet: Yes  Regular Exercise: Yes  Sunscreen Use: Yes  Dental Visits Regularly: Yes  Seat Belt: Yes  Domestic abuse:   No    Health Maintenance reviewed:  Lipid Profile: Due today  Glucose Screen: Due today  Colonoscopy: 9/12/2011, follow-up 10 years  Mammogram: 2/14/2019    Gynecologic History  Patient's last menstrual period was  12/23/2012.  Contraception: post menopausal status  Last Pap: Today. Results were: Records requested from [facility], all normal        Immunization History   Administered Date(s) Administered     DT (pediatric) 10/23/2003     Influenza, inj, historic,unspecified 10/20/2007, 11/06/2008, 10/05/2016     Influenza, seasonal,quad inj 6-35 mos 09/26/2009, 10/21/2010, 11/09/2011, 10/26/2012, 10/17/2013     Influenza,seasonal quad, PF, =/> 6months 10/08/2016, 10/12/2017     Influenza,seasonal, Inj IIV3 11/19/2005, 10/28/2006, 10/20/2007, 11/06/2008, 09/26/2009, 10/21/2010, 11/09/2011, 10/26/2012, 10/17/2013     Influenza,seasonal,quad inj =/> 6months 10/21/2017, 10/25/2018, 11/04/2019     Td, Adult, Absorbed 10/23/2003     Tdap 06/14/2012     Immunization status: up to date and documented.    Current Outpatient Medications   Medication Sig Dispense Refill     aspirin-acetaminophen-caffeine (EXCEDRIN MIGRAINE) 250-250-65 mg per tablet Take 1 tablet by mouth every 6 (six) hours as needed for pain.       calcium carbonate (OS-CRAIG) 600 mg (1,500 mg) tablet Take 600 mg by mouth daily. Plus D 400mg       cholecalciferol, vitamin D3, (VITAMIN D3) 2,000 unit cap Take 2,000 Units by mouth daily.       ferrous sulfate 325 (65 FE) MG tablet Take 1 tablet (325 mg total) by mouth 2 (two) times a day. 60 tablet 3     glucosamine-chondroitin 500-400 mg tablet Take 1 tablet by mouth 3 (three) times a day.       levothyroxine (SYNTHROID, LEVOTHROID) 75 MCG tablet Take 1 tablet (75 mcg total) by mouth Daily at 6:00 am. 90 tablet 4     magnesium 30 mg tablet Take 250 mg by mouth daily.       naproxen (NAPROSYN) 500 MG tablet Take 500 mg by mouth 2 (two) times a day with meals.       OMEGA-3/DHA/EPA/FISH OIL (FISH OIL-OMEGA-3 FATTY ACIDS) 300-1,000 mg capsule Take 1,200 mg by mouth daily. Takes when needed       rizatriptan (MAXALT) 10 MG tablet Take 1 tablet (10mg) by mouth at headache onset, may repeat in 2 (two) hours if needed  (max 30mg/24hrs) 12 tablet 3     No current facility-administered medications for this visit.      History reviewed. No pertinent past medical history.  History reviewed. No pertinent surgical history.  Penicillins and Sulfa (sulfonamide antibiotics)  Family History   Problem Relation Age of Onset     Cancer Mother         skin cancer     Rheum arthritis Mother      Cancer Father         Lymphoma     Crohn's disease Father      Heart disease Maternal Grandfather      Heart disease Paternal Grandmother      Diabetes Paternal Grandmother      Cancer Paternal Grandmother         oral/throat     Hypertension Paternal Grandmother      Breast cancer Neg Hx      Social History     Socioeconomic History     Marital status:      Spouse name: Not on file     Number of children: Not on file     Years of education: Not on file     Highest education level: Not on file   Occupational History     Not on file   Social Needs     Financial resource strain: Not on file     Food insecurity:     Worry: Not on file     Inability: Not on file     Transportation needs:     Medical: Not on file     Non-medical: Not on file   Tobacco Use     Smoking status: Former Smoker     Smokeless tobacco: Never Used   Substance and Sexual Activity     Alcohol use: Not on file     Drug use: Not on file     Sexual activity: Not on file   Lifestyle     Physical activity:     Days per week: Not on file     Minutes per session: Not on file     Stress: Not on file   Relationships     Social connections:     Talks on phone: Not on file     Gets together: Not on file     Attends Sikh service: Not on file     Active member of club or organization: Not on file     Attends meetings of clubs or organizations: Not on file     Relationship status: Not on file     Intimate partner violence:     Fear of current or ex partner: Not on file     Emotionally abused: Not on file     Physically abused: Not on file     Forced sexual activity: Not on file   Other  "Topics Concern     Not on file   Social History Narrative     Not on file       Review of Systems  General:  Denies problem  Eyes: Denies problem  Ears/Nose/Throat: Denies problem  Cardiovascular: Denies problem  Respiratory:  Denies problem  Gastrointestinal:  Denies problem   Genitourinary: Denies problem  Musculoskeletal:  Denies problem  Skin: Denies problem  Neurologic: Denies problem  Psychiatric: Denies problem  Endocrine: Denies problem  Heme/Lymphatic: Denies problem   Allergic/Immunologic: Denies problem            Objective:        Vitals:    02/05/20 0900   BP: 98/62   Pulse: 65   SpO2: 99%   Weight: 126 lb (57.2 kg)   Height: 5' 3\" (1.6 m)     Body mass index is 22.32 kg/m .    Physical Exam:  General Appearance: Alert, pleasant, appears stated age  Head: Normocephalic, without obvious abnormality  Eyes: PERRL, conjunctiva/corneas clear, EOM's intact  Ears: Normal TM's and external ear canals, both ears  Nose: Nares normal, septum midline,mucosa normal, no drainage  Throat: Lips, mucosa, and tongue normal; teeth and gums normal; oropharynx is clear  Neck: Supple,without lymphadenopathy or thyromegally  Lungs: Clear to auscultation bilaterally, respirations unlabored  Heart: Regular rate and rhythm, no murmur   Breast: Declines, performed this morning by gynecologist  Abdomen: Soft, non-tender, no masses, no organomegaly  Extremities: Extremities with strong and symmetric pulses, no cyanosis or edema  Skin: Skin color, texture normal, no rashes or lesions  Neurologic: Normal   Pelvic exam: Not indicated               This note has been dictated using voice recognition software. Any grammatical or context distortions are unintentional and inherent to the the software.   "

## 2021-06-09 NOTE — TELEPHONE ENCOUNTER
Refill Approved    Rx renewed per Medication Renewal Policy. Medication was last renewed on 2/20/2019.    Leta Emery, [facility] Triage/Med Refill 6/23/2020     Requested Prescriptions   Pending Prescriptions Disp Refills     rizatriptan (MAXALT) 10 MG tablet [Pharmacy Med Name: RIZATRIPTAN 10MG TABLETS] 12 tablet 3     Sig: TAKE 1 TABLET BY MOUTH AT HEADACHE ONSET. MAY REPEAT IN 2 HOURS AS NEEDED. MAXIMUM OF 3 TABLETS PER 24 HOURS       Triptans Refill Protocol Passed - 6/23/2020  8:22 AM        Passed - PCP or prescribing provider visit in past 12 months       Last office visit with prescriber/PCP: Visit date not found OR same dept: 9/10/2019 David Alarcon MD OR same specialty: 9/10/2019 David Alarcon MD  Last physical: 2/5/2020 Last MTM visit: Visit date not found   Next visit within 3 mo: Visit date not found  Next physical within 3 mo: Visit date not found  Prescriber OR PCP: Katina Alicia MD  Last diagnosis associated with med order: 1. Migraine  - rizatriptan (MAXALT) 10 MG tablet [Pharmacy Med Name: RIZATRIPTAN 10MG TABLETS]; TAKE 1 TABLET BY MOUTH AT HEADACHE ONSET. MAY REPEAT IN 2 HOURS AS NEEDED. MAXIMUM OF 3 TABLETS PER 24 HOURS  Dispense: 12 tablet; Refill: 3    If protocol passes may refill for 12 months if within 3 months of last provider visit (or a total of 15 months).

## 2021-06-10 ENCOUNTER — RECORDS - HEALTHEAST (OUTPATIENT)
Dept: HEALTH INFORMATION MANAGEMENT | Facility: CLINIC | Age: 61
End: 2021-06-10

## 2021-06-10 NOTE — PATIENT INSTRUCTIONS - HE
Avoid scratching  Ice packs may help  Elevate foot  Benadryl 25-50mg every 6-8 hours for itch and swelling  May switch to zyrtec or equivalent daily for up to 2 weeks to be less sedating than benadryl - but benadryl will work faster  Prednisone 20mg daily for 5 days, if no improvement may take one day of 40mg  To ER if shortness of breath, CP, tongue swelling, wheezing, severe pain in leg, fever   Otherwise recheck with us or primary clinic if no improvement in 2-3 days, sooner if worse

## 2021-06-10 NOTE — PROGRESS NOTES
Assessment/Plan:   Bee sting reaction, accidental or unintentional, initial encounter  Bee sting left anterior knee 1 week ago with a local reaction initially around the knee and upper leg - red tender and itchy, swollen. That resolved within several days but then she had itching in that area last night and scratched it vigorously and today there is swelling from knee down to toes, itchy in that entire area, mild tenderness and faintly red. No systemic sxs to suggest diffuse reaction or anaphylaxis. Tired topical benadryl but no oral antihistamines.   Consider reactivation or delayed large local inflammatory reaction to the bee sting. No wound or retained stinger apparent.   Consider histamine reaction triggered by scratching  Consider cellulitis following the bee sting though there are no demarcated borders of redness, not much warmth or pain in the skin.   Consider DVT - no blood clotting risks or prior history of clots. No palpable venous cords, equivocal homans sign. It is too late tonight to obtain a venous ultrasound and I do not believe she needs to be seen in the ER now to have it done unless there is no improvement with benadryl and prednisone overnight.     Treat with benadryl tonight since she has not taken oral antihistamine and start prednisone to block the inflammatory reaction in the morning if unable to obtain tonight, elevate, ice packs, close follow up if not improving, sooner if worse or systemic sxs develop.   I discussed red flag symptoms, return precautions to clinic/ER and follow up care with patient/parent.  Expected clinical course, symptomatic cares advised. Questions answered. Patient/parent amenable with plan.  - predniSONE (DELTASONE) 20 MG tablet; 20mg daily for 7 days.  Dispense: 7 tablet; Refill: 0    Avoid scratching  Ice packs may help  Elevate foot  Benadryl 25-50mg every 6-8 hours for itch and swelling  May switch to zyrtec or equivalent daily for up to 2 weeks to be less sedating  than benadryl - but benadryl will work faster  Prednisone 20mg daily for 5 days, if no improvement may take one day of 40mg  To ER if shortness of breath, CP, tongue swelling, wheezing, severe pain in leg, fever   Otherwise recheck with us or primary clinic if no improvement in 2-3 days, sooner if worse    Patient wore mask throughout visit  Provider and staff wore mask , face shield and gloves throughout.     Subjective:      Bee Schulz is a 59 y.o. female who presents for evaluation of left lower leg itching and swelling. This just started yesterday with itching and she scratched the left lower leg vigorously and then this morning the lower leg appeared swollen and has continued to swell more throughout the day from knee to ankle and foot. She has applied topical benadryl without benefit and some ice packs which did help with the itch. There is mild redness and mild tenderness. The leg is feeling tight from the swelling. About 10 days ago (8/4/20) she was stung by a bee on the anterior left knee and had a typical reaction with an area of mild swelling, redness, pain and itching around the sting covering the anteromedial knee and upper tibia. This had essentially resolved when the itching began again last night. No rash or hives elsewhere, no swelling of lips or tongue, no wheeze or shortness of breath, no palpitations or CP, no dizziness or syncope.   No N/V/D or abdominal pain or cramping. Remaining ROS negative.   She has had bee stings before though not for about 20 years and in the past they were similar to the initial reaction she had last week. She feels well. No fever. Not a lot of warmth or tenderness of the skin. Her coworkers urged her to come it.   No known ill contacts. No high risk covid exposures.   Former smoker.     Allergies   Allergen Reactions     Penicillins      Sulfa (Sulfonamide Antibiotics) Rash     Current Outpatient Medications on File Prior to Visit   Medication Sig Dispense Refill      aspirin-acetaminophen-caffeine (EXCEDRIN MIGRAINE) 250-250-65 mg per tablet Take 1 tablet by mouth every 6 (six) hours as needed for pain.       calcium carbonate (OS-CRAIG) 600 mg (1,500 mg) tablet Take 600 mg by mouth daily. Plus D 400mg       cholecalciferol, vitamin D3, (VITAMIN D3) 2,000 unit cap Take 2,000 Units by mouth daily.       ferrous sulfate 325 (65 FE) MG tablet Take 1 tablet (325 mg total) by mouth 2 (two) times a day. 60 tablet 3     glucosamine-chondroitin 500-400 mg tablet Take 1 tablet by mouth 3 (three) times a day.       levothyroxine (SYNTHROID, LEVOTHROID) 75 MCG tablet Take 1 tablet (75 mcg total) by mouth Daily at 6:00 am. 90 tablet 4     magnesium 30 mg tablet Take 250 mg by mouth daily.       naproxen (NAPROSYN) 500 MG tablet Take 500 mg by mouth 2 (two) times a day with meals.       OMEGA-3/DHA/EPA/FISH OIL (FISH OIL-OMEGA-3 FATTY ACIDS) 300-1,000 mg capsule Take 1,200 mg by mouth daily. Takes when needed       rizatriptan (MAXALT) 10 MG tablet TAKE 1 TABLET BY MOUTH AT HEADACHE ONSET. MAY REPEAT IN 2 HOURS AS NEEDED. MAXIMUM OF 3 TABLETS PER 24 HOURS 12 tablet 3     No current facility-administered medications on file prior to visit.      Patient Active Problem List   Diagnosis     Nontoxic Multinodular Goiter     Hypothyroidism due to Hashimoto's thyroiditis     Vitamin D Deficiency     Gilbert's Syndrome     History of basal cell carcinoma     Migraines       Objective:     /68   Pulse 68   Resp 16   Wt 127 lb (57.6 kg)   LMP 12/23/2012   SpO2 97%   BMI 22.50 kg/m      Physical  General Appearance: Alert, cooperative, no distress, mask on, AVSS  Head: Normocephalic, without obvious abnormality, atraumatic  Eyes: Conjunctivae are normal.   Heart: Regular rate and rhythm  Extremities: No lower extremity edema on the right side. There is swelling of the left lower leg from knee to toes. Equivocal raymond's sign - felt tightness in calf, not pain. No palpable venous cords. Not  significantly warm or red or painful to touch the skin. There is faint redness proximally near the medial knee and area of prior redness. Very itchy all over and only slightly tender when scratching or with palpation diffusely. No breaks in the skin, sores, blisters, weeping or retained stingers apparent.   Skin: as above, no other rashes or lesions  Psychiatric: Patient has a normal mood and affect.

## 2021-06-14 NOTE — PROGRESS NOTES
Assessment/Plan:     1. Routine general medical examination at a health care facility  Encouraged healthy lifestyle habits including regular exercise, healthy eating habits, and adequate calcium and vitamin D intake.  Will obtain fasting glucose and fasting lipids for screening due to family history.  [facility] regarding prior Pap smears.  Up-to-date with mammograms and colonoscopy.  Immunizations up-to-date, has received influenza vaccine.  - Glucose  - Lipid Cascade FASTING    2. Hashimoto's disease  Obtain thyroid cascade today.  Of note, she missed one dose of levothyroxine about 4 days ago.  - Thyroid Cascade    3. Nontoxic multinodular goiter  Stable without symptoms.    4. Vitamin D deficiency  Encouraged daily supplement.  Will check vitamin D level today.  - Vitamin D, Total (25-Hydroxy)    5. Gilbert's Syndrome  We will obtain follow-up hepatic profile per patient request given reduction in Excedrin intake.  - Hepatic Profile    6. Migraines  Significant improvement with efforts to eliminate rebound headaches.  Congratulated her on her efforts.  Continue Maxalt as needed.        Subjective:     Bee Schulz is a 57 y.o. female who presents for an annual exam.  She is doing well over the past year.  She eliminated the regular intake of Excedrin and has noted a reduction in the severity of her headaches.  More often having some low-grade headaches but they are not becoming severe.  Maxalt continues to work well.  Noting that weather and alcohol seem to be triggers.  Remains on levothyroxine for management of hypothyroidism, missed a dose about 4 days ago.  History of nontoxic multinodular goiter, she remains without symptoms in regards to that.  She is a history of vitamin D deficiency, taking daily supplement.  Continues to see dermatologist regularly due to history of basal cell and melanoma skin cancers.    She is .  3 children including 2 sons  in the  and a daughter who is pursuing speech pathology.  Exercising regularly.  Feels like she is eating well, his transition to an organic based diet.  Sleeping well.  Denies use of tobacco.    Healthy Habits:   Healthy Diet: Yes  Regular Exercise: Yes  Sunscreen Use: Yes  Dental Visits Regularly: Yes  Seat Belt: Yes  Domestic abuse:   No    Health Maintenance reviewed:  Lipid Profile: due today  Glucose Screen: due today  Colonoscopy: 9/12/11, f/u 10 years  Mammogram: 1/5/17    Gynecologic History  Patient's last menstrual period was 12/23/2012.  Contraception: post menopausal status  Last Pap: per [facility]. Results were: normal        Immunization History   Administered Date(s) Administered     DT (pediatric) 10/23/2003     Influenza, inj, historic,unspecified 10/20/2007, 11/06/2008, 10/05/2016     Influenza, seasonal,quad inj 36+ mos 10/21/2017     Influenza, seasonal,quad inj 6-35 mos 09/26/2009, 10/21/2010, 11/09/2011, 10/26/2012, 10/17/2013     Td,adult,historic,unspecified 10/23/2003     Tdap 06/14/2012     Immunization status: up to date and documented.    Current Outpatient Prescriptions   Medication Sig Dispense Refill     aspirin-acetaminophen-caffeine (EXCEDRIN MIGRAINE) 250-250-65 mg per tablet Take 1 tablet by mouth every 6 (six) hours as needed for pain.       calcium carbonate (OS-CRAIG) 600 mg (1,500 mg) tablet Take 600 mg by mouth daily. Plus D 400mg       cholecalciferol, vitamin D3, (VITAMIN D3) 2,000 unit cap Take 2,000 Units by mouth daily.       glucosamine-chondroitin 500-400 mg tablet Take 1 tablet by mouth 3 (three) times a day.       levothyroxine (SYNTHROID, LEVOTHROID) 75 MCG tablet TAKE ONE TABLET BY MOUTH ONE TIME DAILY AT 6:00AM 90 tablet 0     magnesium 30 mg tablet Take 250 mg by mouth daily.       naproxen (NAPROSYN) 500 MG tablet Take 500 mg by mouth 2 (two) times a day with meals.       OMEGA-3/DHA/EPA/FISH OIL (FISH OIL-OMEGA-3 FATTY ACIDS) 300-1,000 mg capsule Take  1,200 mg by mouth daily. Takes when needed       rizatriptan (MAXALT) 10 MG tablet Take 1 tablet (10mg) by mouth at headache onset, may repeat in 2 (two) hours if needed (max 30mg/24hrs) 12 tablet 3     scopolamine (TRANSDERM-SCOP) 1.5 mg (1 mg over 3 days) transdermal patch Place 1 patch on the skin every third day. 6 patch 1     No current facility-administered medications for this visit.      No past medical history on file.  No past surgical history on file.  Penicillins and Sulfa (sulfonamide antibiotics)  Family History   Problem Relation Age of Onset     Cancer Mother      skin cancer     Rheum arthritis Mother      Cancer Father      Lymphoma     Crohn's disease Father      Heart disease Maternal Grandfather      Heart disease Paternal Grandmother      Diabetes Paternal Grandmother      Cancer Paternal Grandmother      oral/throat     Hypertension Paternal Grandmother      Social History     Social History     Marital status:      Spouse name: N/A     Number of children: N/A     Years of education: N/A     Occupational History     Not on file.     Social History Main Topics     Smoking status: Former Smoker     Smokeless tobacco: Never Used     Alcohol use Not on file     Drug use: Not on file     Sexual activity: Not on file     Other Topics Concern     Not on file     Social History Narrative       Review of Systems  General:  Denies problem  Eyes: Denies problem  Ears/Nose/Throat: Denies problem  Cardiovascular: Denies problem  Respiratory:  Denies problem  Gastrointestinal:  Denies problem   Genitourinary: Denies problem  Musculoskeletal:  Denies problem  Skin: Denies problem  Neurologic: Denies problem  Psychiatric: Denies problem  Endocrine: Denies problem  Heme/Lymphatic: Denies problem   Allergic/Immunologic: Denies problem            Objective:        Vitals:    12/21/17 0738   BP: 110/60   Pulse: 62   Resp: 20   Temp: 98.1  F (36.7  C)   TempSrc: Oral   Weight: 126 lb 12.8 oz (57.5 kg)  "  Height: 5' 3\" (1.6 m)     Body mass index is 22.46 kg/(m^2).    Physical Exam:  General Appearance: Alert, pleasant, appears stated age  Head: Normocephalic, without obvious abnormality  Eyes: PERRL, conjunctiva/corneas clear, EOM's intact  Ears: Normal TM's and external ear canals, both ears  Nose: Nares normal, septum midline,mucosa normal, no drainage  Throat: Lips, mucosa, and tongue normal; teeth and gums normal; oropharynx is clear  Neck: Supple,without lymphadenopathy or thyromegally  Lungs: Clear to auscultation bilaterally, respirations unlabored  Heart: Regular rate and rhythm, no murmur   Breast:  Normal appearance.  No palpable masses, nipple discharge, or skin changes  Abdomen: Soft, non-tender, no masses, no organomegaly  Extremities: Extremities with strong and symmetric pulses, no cyanosis or edema  Skin: Skin color, texture normal, no rashes or lesions  Neurologic: Normal   Pelvic exam: Not indicated               This note has been dictated using voice recognition software. Any grammatical or context distortions are unintentional and inherent to the the software.    "
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
## 2021-06-15 NOTE — TELEPHONE ENCOUNTER
records received . 2/9/2021 11:40am inside [facility]  [facility] -   Referral: Carol Chang  DX: worsening osteopenia  For Dr Multani.

## 2021-06-15 NOTE — TELEPHONE ENCOUNTER
Date: 6/9/2021 Status: [facility]   Time: 9:40 AM Length: 40   Visit Type: INITIAL SUBSPECIALTY APPT [[id number]] Copay: $0.00   Provider: Loc Multani MD

## 2021-06-15 NOTE — PATIENT INSTRUCTIONS - HE
I have placed an order for Cologuard for colon cancer screening.  Please ensure it is covered by insurance prior to completing.    We will check parathyroid hormone function, renal function, calcium, and phosphorus today in addition to your vitamin D level to ensure we are not missing any underlying causes of low bone density.  Continue to work on regular weightbearing exercises and measures to reduce your risk of falls.    Consider Shingrix (the new shingles vaccine, 2 dose series) to reduce your risk of shingles.  Check your insurance coverage first.

## 2021-06-16 NOTE — TELEPHONE ENCOUNTER
Refill Approved    Rx renewed per Medication Renewal Policy. Medication was last renewed on 2/5/20.    Keenan Atwood, [facility] Connection Triage/Med Refill 4/16/2021     Requested Prescriptions   Pending Prescriptions Disp Refills     levothyroxine (SYNTHROID, LEVOTHROID) 75 MCG tablet 90 tablet 4     Sig: Take 1 tablet (75 mcg total) by mouth Daily at 6:00 am.       Thyroid Hormones Protocol Passed - 4/14/2021  3:32 PM        Passed - Provider visit in past 12 months or next 3 months     Last office visit with prescriber/PCP: Visit date not found OR same dept: Visit date not found OR same specialty: 9/10/2019 David Alarcon MD  Last physical: 2/17/2021 Last MTM visit: Visit date not found   Next visit within 3 mo: Visit date not found  Next physical within 3 mo: Visit date not found  Prescriber OR PCP: Katina Alicia MD  Last diagnosis associated with med order: 1. Hypothyroidism due to Hashimoto's thyroiditis  - levothyroxine (SYNTHROID, LEVOTHROID) 75 MCG tablet; Take 1 tablet (75 mcg total) by mouth Daily at 6:00 am.  Dispense: 90 tablet; Refill: 4    If protocol passes may refill for 12 months if within 3 months of last provider visit (or a total of 15 months).             Passed - TSH on file in past 12 months for patient age 12 & older     TSH   Date Value Ref Range Status   02/17/2021 2.69 0.30 - 5.00 uIU/mL Final

## 2021-06-20 ENCOUNTER — HEALTH MAINTENANCE LETTER (OUTPATIENT)
Age: 61
End: 2021-06-20

## 2021-06-23 NOTE — PROGRESS NOTES
Assessment/Plan:     1. Routine general medical examination at a health care facility  Encouraged healthy lifestyle habits including regular exercise, healthy eating habits, and adequate calcium and vitamin D intake.  Will check fasting glucose due to family history of diabetes, will also check fasting lipids to screen for hyperlipidemia.  She has an appointment scheduled with [place] [facility] for routine Pap smear and will attend that appointment.  Discussed mammogram every 1-2 years.  Up-to-date with colon cancer screening.  - Glucose  - Lipid Cascade FASTING    2. Nontoxic multinodular goiter  3. Hypothyroidism  Clinically euthyroid.  Continue levothyroxine.  Will check thyroid cascade today.  - levothyroxine (SYNTHROID, LEVOTHROID) 75 MCG tablet; Take 1 tablet (75 mcg total) by mouth Daily at 6:00 am.  Dispense: 90 tablet; Refill: 3  - Thyroid [place]    4. Migraine without status migrainosus, not intractable, unspecified migraine type  Stable with intermittent use of Maxalt.    5. Gilbert's Syndrome  Chronic, bilirubin level has been stable, I do not feel that we need to do any ongoing monitoring.    6. Vitamin D deficiency  We will check vitamin D level today.  - Vitamin D, Total (25-Hydroxy)     7.  Thumb pain, hand weakness  Symptoms are not suggestive of underlying inflammatory arthropathy, reassurance provided, discussed symptoms of inflammatory arthropathy and when to seek care.  We discussed differential diagnosis to include very mild osteoarthritis at the first and CMC joint, nerve compression, or tendinitis.  As symptoms are mild, she may treat sparingly with naproxen.  If worsening, she will let me know.    There are no Patient Instructions on file for this visit.     Return in about 1 year (around 1/16/2020) for Annual physical.          Subjective:     Bee Schulz is a 58 y.o. female who presents for an annual exam.  Is been doing well over the past year.  Noting some sensation of loss of  strength in her hands, especially at the base of her thumb.  She is feeling as though her hands are just a little bit weaker.  Sometimes will have a tingling at the wrist or in her hand but no specific pattern, believes it affects her pinky more than her thumb but is uncertain.  She is never had any redness, edema, but does endorse some stiffness in the morning that lasts less than 1 hour and is quite mild.  Mother with rheumatoid arthritis.  Migraines have been stable with intermittent need for Maxalt, usually in clusters.  Feels that her thyroid is in good balance, noting no new lumps.  She is on a vitamin D supplement due to prior deficiency.  History of Gilbert's disease, has been stable with monitoring in the past.  Sees dermatologist due to history of skin cancer.    She is .  She works as an .  3 children.  Up to 10 alcoholic beverages per week but oftentimes none.    Healthy Habits:   Healthy Diet: Yes  Regular Exercise: Yes  Sunscreen Use: Yes  Dental Visits Regularly: Yes  Seat Belt: Yes  Domestic abuse:   No    Health Maintenance reviewed:  Lipid Profile: Due today  Glucose Screen: Today  Colonoscopy: 2011, follow-up 10 years  Mammogram: 2/2/2018    Gynecologic History  Patient's last menstrual period was 12/23/2012.  Contraception: post menopausal status  Last Pap: 2010, has appointment scheduled at [facility].  She declines Pap smear today.        Immunization History   Administered Date(s) Administered     DT (pediatric) 10/23/2003     Influenza, inj, historic,unspecified 10/20/2007, 11/06/2008, 10/05/2016     Influenza, seasonal,quad inj 36+ mos 10/21/2017, 10/25/2018     Influenza, seasonal,quad inj 6-35 mos 09/26/2009, 10/21/2010, 11/09/2011, 10/26/2012, 10/17/2013     Influenza,seasonal quad, PF, 36+MOS 10/08/2016, 10/12/2017     Influenza,seasonal, Inj IIV3 11/19/2005, 10/28/2006, 10/20/2007, 11/06/2008, 09/26/2009, 10/21/2010, 11/09/2011, 10/26/2012, 10/17/2013      Td, Adult, Absorbed 10/23/2003     Tdap 06/14/2012     Immunization status: up to date and documented.    Current Outpatient Medications   Medication Sig Dispense Refill     aspirin-acetaminophen-caffeine (EXCEDRIN MIGRAINE) 250-250-65 mg per tablet Take 1 tablet by mouth every 6 (six) hours as needed for pain.       calcium carbonate (OS-CRAIG) 600 mg (1,500 mg) tablet Take 600 mg by mouth daily. Plus D 400mg       cholecalciferol, vitamin D3, (VITAMIN D3) 2,000 unit cap Take 2,000 Units by mouth daily.       glucosamine-chondroitin 500-400 mg tablet Take 1 tablet by mouth 3 (three) times a day.       levothyroxine (SYNTHROID, LEVOTHROID) 75 MCG tablet Take 1 tablet (75 mcg total) by mouth Daily at 6:00 am. 90 tablet 3     magnesium 30 mg tablet Take 250 mg by mouth daily.       naproxen (NAPROSYN) 500 MG tablet Take 500 mg by mouth 2 (two) times a day with meals.       OMEGA-3/DHA/EPA/FISH OIL (FISH OIL-OMEGA-3 FATTY ACIDS) 300-1,000 mg capsule Take 1,200 mg by mouth daily. Takes when needed       rizatriptan (MAXALT) 10 MG tablet Take 1 tablet (10mg) by mouth at headache onset, may repeat in 2 (two) hours if needed (max 30mg/24hrs) 12 tablet 3     No current facility-administered medications for this visit.      History reviewed. No pertinent past medical history.  History reviewed. No pertinent surgical history.  Penicillins and Sulfa (sulfonamide antibiotics)  Family History   Problem Relation Age of Onset     Cancer Mother         skin cancer     Rheum arthritis Mother      Cancer Father         Lymphoma     Crohn's disease Father      Heart disease Maternal Grandfather      Heart disease Paternal Grandmother      Diabetes Paternal Grandmother      Cancer Paternal Grandmother         oral/throat     Hypertension Paternal Grandmother      Breast cancer Neg Hx      Social History     Socioeconomic History     Marital status:      Spouse name: Not on file     Number of children: Not on file     Years of  education: Not on file     Highest education level: Not on file   Social Needs     Financial resource strain: Not on file     Food insecurity - worry: Not on file     Food insecurity - inability: Not on file     Transportation needs - medical: Not on file     Transportation needs - non-medical: Not on file   Occupational History     Not on file   Tobacco Use     Smoking status: Former Smoker     Smokeless tobacco: Never Used   Substance and Sexual Activity     Alcohol use: Not on file     Drug use: Not on file     Sexual activity: Not on file   Other Topics Concern     Not on file   Social History Narrative     Not on file       Review of Systems  General:  Denies problem  Eyes: Denies problem  Ears/Nose/Throat: Denies problem  Cardiovascular: Denies problem  Respiratory:  Denies problem  Gastrointestinal:  Denies problem   Genitourinary: Denies problem  Musculoskeletal:  Denies problem  Skin: Denies problem  Neurologic: Denies problem  Psychiatric: Denies problem  Endocrine: Denies problem  Heme/Lymphatic: Denies problem   Allergic/Immunologic: Denies problem            Objective:        There were no vitals filed for this visit.  There is no height or weight on file to calculate BMI.    Physical Exam:  General Appearance: Alert, pleasant, appears stated age  Head: Normocephalic, without obvious abnormality  Eyes: PERRL, conjunctiva/corneas clear, EOM's intact  Ears: Normal TM's and external ear canals, both ears  Nose: Nares normal, septum midline,mucosa normal, no drainage  Throat: Lips, mucosa, and tongue normal; teeth and gums normal; oropharynx is clear  Neck: Supple,without lymphadenopathy or thyromegally  Lungs: Clear to auscultation bilaterally, respirations unlabored  Heart: Regular rate and rhythm, no murmur   Breast:  Normal appearance.  No palpable masses, nipple discharge, or skin changes  Abdomen: Soft, non-tender, no masses, no organomegaly  Extremities: Extremities with strong and symmetric pulses,  no cyanosis or edema  Skin: Skin color, texture normal, no rashes or lesions  Neurologic: Normal   Pelvic exam: Not indicated               This note has been dictated using voice recognition software. Any grammatical or context distortions are unintentional and inherent to the the software.

## 2021-06-24 NOTE — TELEPHONE ENCOUNTER
Refill Approved    Rx renewed per Medication Renewal Policy. Medication was last renewed on 1.16.19.    Chanelle Farah, [facility] Connection Triage/Med Refill 2/20/2019     Requested Prescriptions   Pending Prescriptions Disp Refills     rizatriptan (MAXALT) 10 MG tablet 12 tablet 3     Sig: Take 1 tablet (10mg) by mouth at headache onset, may repeat in 2 (two) hours if needed (max 30mg/24hrs)    Triptans Refill Protocol Passed - 2/18/2019 10:59 AM       Passed - PCP or prescribing provider visit in past 12 months      Last office visit with prescriber/PCP: Visit date not found OR same dept: Visit date not found OR same specialty: Visit date not found  Last physical: 1/16/2019 Last MTM visit: Visit date not found   Next visit within 3 mo: Visit date not found  Next physical within 3 mo: Visit date not found  Prescriber OR PCP: Katina Alicia MD  Last diagnosis associated with med order: 1. Migraine  - rizatriptan (MAXALT) 10 MG tablet; Take 1 tablet (10mg) by mouth at headache onset, may repeat in 2 (two) hours if needed (max 30mg/24hrs)  Dispense: 12 tablet; Refill: 3    If protocol passes may refill for 12 months if within 3 months of last provider visit (or a total of 15 months).

## 2021-07-13 ENCOUNTER — RECORDS - HEALTHEAST (OUTPATIENT)
Dept: ADMINISTRATIVE | Facility: CLINIC | Age: 61
End: 2021-07-13

## 2021-07-21 ENCOUNTER — RECORDS - HEALTHEAST (OUTPATIENT)
Dept: ADMINISTRATIVE | Facility: CLINIC | Age: 61
End: 2021-07-21

## 2021-07-30 DIAGNOSIS — G43.909 MIGRAINE: ICD-10-CM

## 2021-07-30 RX ORDER — RIZATRIPTAN BENZOATE 10 MG/1
TABLET ORAL
Qty: 12 TABLET | Refills: 1 | Status: SHIPPED | OUTPATIENT
Start: 2021-07-30 | End: 2022-03-21

## 2021-07-31 NOTE — TELEPHONE ENCOUNTER
"Last Written Prescription Date:  6/23/2020  Last Fill Quantity: 12,  # refills: 3   Last office visit provider:  2/17/2021     Requested Prescriptions   Pending Prescriptions Disp Refills     rizatriptan (MAXALT) 10 MG tablet [Pharmacy Med Name: RIZATRIPTAN 10MG TABLETS] 12 tablet 3     Sig: TAKE 1 TABLET BY MOUTH AT ONSET OF HEADACHE. MAY REPEAT IN 2 HOURS AS NEEDED. MAXIMUM OF 3 TABLETS PER 24 HOURS       Serotonin Agonists Failed - 7/30/2021  9:44 AM        Failed - Serotonin Agonist request needs review.     Please review patient's record. If patient has had 8 or more treatments in the past month, please forward to provider. *PASSED per last time refilled and request for medication.          Passed - Blood pressure under 140/90 in past 12 months     BP Readings from Last 3 Encounters:   06/29/21 106/78   02/17/21 104/64   08/13/20 105/68                 Passed - Recent (12 mo) or future (30 days) visit within the authorizing provider's specialty     Patient has had an office visit with the authorizing provider or a provider within the authorizing providers department within the previous 12 mos or has a future within next 30 days. See \"Patient Info\" tab in inbasket, or \"Choose Columns\" in Meds & Orders section of the refill encounter.              Passed - Medication is active on med list        Passed - Patient is age 18 or older        Passed - No active pregnancy on record        Passed - No positive pregnancy test in past 12 months             Julia Rehman RN 07/30/21 10:42 PM  "

## 2021-08-04 ENCOUNTER — ANCILLARY PROCEDURE (OUTPATIENT)
Dept: BONE DENSITY | Facility: CLINIC | Age: 61
End: 2021-08-04
Attending: INTERNAL MEDICINE
Payer: COMMERCIAL

## 2021-08-04 DIAGNOSIS — M85.80 OSTEOPENIA, UNSPECIFIED LOCATION: ICD-10-CM

## 2021-08-04 DIAGNOSIS — E06.3 HYPOTHYROIDISM DUE TO HASHIMOTO'S THYROIDITIS: ICD-10-CM

## 2021-08-04 PROCEDURE — 77080 DXA BONE DENSITY AXIAL: CPT | Mod: 26 | Performed by: INTERNAL MEDICINE

## 2021-08-19 ENCOUNTER — OFFICE VISIT (OUTPATIENT)
Dept: INTERNAL MEDICINE | Facility: CLINIC | Age: 61
End: 2021-08-19
Payer: COMMERCIAL

## 2021-08-19 VITALS
OXYGEN SATURATION: 99 % | DIASTOLIC BLOOD PRESSURE: 68 MMHG | WEIGHT: 128 LBS | SYSTOLIC BLOOD PRESSURE: 102 MMHG | BODY MASS INDEX: 22.86 KG/M2 | HEART RATE: 66 BPM

## 2021-08-19 DIAGNOSIS — M85.89 OSTEOPENIA OF MULTIPLE SITES: Primary | ICD-10-CM

## 2021-08-19 PROCEDURE — 99214 OFFICE O/P EST MOD 30 MIN: CPT | Performed by: INTERNAL MEDICINE

## 2021-08-19 RX ORDER — ALENDRONATE SODIUM 70 MG/1
70 TABLET ORAL
Qty: 12 TABLET | Refills: 3 | Status: SHIPPED | OUTPATIENT
Start: 2021-08-19 | End: 2022-09-06

## 2021-08-19 NOTE — PROGRESS NOTES
(M85.89) Osteopenia of multiple sites  (primary encounter diagnosis)  Comment: Patient has osteopenia with moderate fracture risk and lowest T-score -2.4 in the lumbar spine with significant decline in the bone density compare to DXA done in 2017.  Treatment options discussed:  1. Close monitoring. Appropriate calcium, vitamin D supplements, along with balance and weight bearing exercise recommended with followup bone density scan in 1 year.  2. Starting Fosamax weekly because of the significant decline and low T-score in lumbar spine -2.4 and repeat DXA in 1 year.    She would like to have some time before she makes the decision.  Plan: alendronate (FOSAMAX) 70 MG tablet        Side effects of Fosamax reviewed as also appropriate intake.  Calcium, vit D daily needs and weight bearing exercise printed info provided and discussed.  Labs from 2/2021 reviewed. Patient denies h/o anorexia and glutens sensitivity.    Total time spent on the date of this encounter doing: chart review, review of test results, patient visit, physical exam, education, counseling, developing plan of care, and documenting = 30    minutes.    This note has been dictated using voice recognition software. Any grammatical or context distortions are unintentional and inherent to the software.      Return in about 1 year (around 8/19/2022) for Follow up, osteoporosis.    There are no Patient Instructions on file for this visit.        Subjective:    Bee Schulz is a 60 year old female  here for follow up.      Social History     Socioeconomic History     Marital status:      Spouse name: Not on file     Number of children: Not on file     Years of education: Not on file     Highest education level: Not on file   Occupational History     Not on file   Tobacco Use     Smoking status: Former Smoker     Smokeless tobacco: Never Used   Substance and Sexual Activity     Alcohol use: Not on file     Drug use: Not on file     Sexual activity: Not  on file   Other Topics Concern     Not on file   Social History Narrative     Not on file     Social Determinants of Health     Financial Resource Strain:      Difficulty of Paying Living Expenses:    Food Insecurity:      Worried About Running Out of Food in the Last Year:      Ran Out of Food in the Last Year:    Transportation Needs:      Lack of Transportation (Medical):      Lack of Transportation (Non-Medical):    Physical Activity:      Days of Exercise per Week:      Minutes of Exercise per Session:    Stress:      Feeling of Stress :    Social Connections:      Frequency of Communication with Friends and Family:      Frequency of Social Gatherings with Friends and Family:      Attends Scientologist Services:      Active Member of Clubs or Organizations:      Attends Club or Organization Meetings:      Marital Status:    Intimate Partner Violence:      Fear of Current or Ex-Partner:      Emotionally Abused:      Physically Abused:      Sexually Abused:        Family History   Problem Relation Age of Onset     Cancer Mother         skin cancer     Rheumatoid Arthritis Mother      Cancer Father         Lymphoma     Crohn's Disease Father      Heart Disease Maternal Grandfather      Heart Disease Paternal Grandmother      Diabetes Paternal Grandmother      Cancer Paternal Grandmother         oral/throat     Hypertension Paternal Grandmother      Breast Cancer No family hx of      Review of Systems:  A 12 point comprehensive review of systems was negative except as noted in HPI.        Objective:    Physical Exam   /68   Pulse 66   Wt 58.1 kg (128 lb)   SpO2 99%   BMI 22.86 kg/m    Body mass index is 22.86 kg/m .    Constitutional: oriented to person, place, and time, appears well-nourished. No distress.         Patient Active Problem List   Diagnosis     Nontoxic Multinodular Goiter     Hypothyroidism due to Hashimoto's thyroiditis     Vitamin D Deficiency     Gilbert's Syndrome     History of basal cell  carcinoma     Migraines       Current Outpatient Medications   Medication     alendronate (FOSAMAX) 70 MG tablet     aspirin-acetaminophen-caffeine (EXCEDRIN MIGRAINE) 250-250-65 mg per tablet     calcium carbonate (OS-CRAIG) 600 mg (1,500 mg) tablet     cholecalciferol, vitamin D3, (VITAMIN D3) 2,000 unit cap     ferrous sulfate 325 (65 FE) MG tablet     glucosamine-chondroitin 500-400 mg tablet     levothyroxine (SYNTHROID, LEVOTHROID) 75 MCG tablet     magnesium 30 mg tablet     naproxen (NAPROSYN) 500 MG tablet     OMEGA-3/DHA/EPA/FISH OIL (FISH OIL-OMEGA-3 FATTY ACIDS) 300-1,000 mg capsule     rizatriptan (MAXALT) 10 MG tablet     scopolamine (TRANSDERM-SCOP) 1.5 mg transdermal patch     No current facility-administered medications for this visit.               Loc Multani MD  8/19/2021

## 2021-10-11 ENCOUNTER — HEALTH MAINTENANCE LETTER (OUTPATIENT)
Age: 61
End: 2021-10-11

## 2022-03-21 ENCOUNTER — TRANSFERRED RECORDS (OUTPATIENT)
Dept: HEALTH INFORMATION MANAGEMENT | Facility: CLINIC | Age: 62
End: 2022-03-21

## 2022-03-21 ENCOUNTER — OFFICE VISIT (OUTPATIENT)
Dept: FAMILY MEDICINE | Facility: CLINIC | Age: 62
End: 2022-03-21
Payer: COMMERCIAL

## 2022-03-21 VITALS
DIASTOLIC BLOOD PRESSURE: 64 MMHG | OXYGEN SATURATION: 98 % | RESPIRATION RATE: 16 BRPM | TEMPERATURE: 97 F | HEART RATE: 62 BPM | SYSTOLIC BLOOD PRESSURE: 100 MMHG | HEIGHT: 63 IN | BODY MASS INDEX: 22.15 KG/M2 | WEIGHT: 125 LBS

## 2022-03-21 DIAGNOSIS — M85.80 OSTEOPENIA, UNSPECIFIED LOCATION: ICD-10-CM

## 2022-03-21 DIAGNOSIS — R19.7 DIARRHEA, UNSPECIFIED TYPE: ICD-10-CM

## 2022-03-21 DIAGNOSIS — E55.9 VITAMIN D DEFICIENCY: ICD-10-CM

## 2022-03-21 DIAGNOSIS — E04.2 NONTOXIC MULTINODULAR GOITER: ICD-10-CM

## 2022-03-21 DIAGNOSIS — Z00.00 ROUTINE PHYSICAL EXAMINATION: Primary | ICD-10-CM

## 2022-03-21 DIAGNOSIS — Z86.39 HISTORY OF IRON DEFICIENCY: ICD-10-CM

## 2022-03-21 DIAGNOSIS — R15.9 INCONTINENCE OF FECES, UNSPECIFIED FECAL INCONTINENCE TYPE: ICD-10-CM

## 2022-03-21 DIAGNOSIS — G43.909 MIGRAINE WITHOUT STATUS MIGRAINOSUS, NOT INTRACTABLE, UNSPECIFIED MIGRAINE TYPE: ICD-10-CM

## 2022-03-21 DIAGNOSIS — E06.3 HYPOTHYROIDISM DUE TO HASHIMOTO'S THYROIDITIS: ICD-10-CM

## 2022-03-21 LAB
CHOLEST SERPL-MCNC: 202 MG/DL
FASTING STATUS PATIENT QL REPORTED: YES
FASTING STATUS PATIENT QL REPORTED: YES
GLUCOSE BLD-MCNC: 91 MG/DL (ref 70–125)
HDLC SERPL-MCNC: 79 MG/DL
HGB BLD-MCNC: 13.2 G/DL (ref 11.7–15.7)
LDLC SERPL CALC-MCNC: 105 MG/DL
TRIGL SERPL-MCNC: 92 MG/DL
TSH SERPL DL<=0.005 MIU/L-ACNC: 2.07 UIU/ML (ref 0.3–5)

## 2022-03-21 PROCEDURE — 80061 LIPID PANEL: CPT | Performed by: FAMILY MEDICINE

## 2022-03-21 PROCEDURE — 85018 HEMOGLOBIN: CPT | Performed by: FAMILY MEDICINE

## 2022-03-21 PROCEDURE — 82306 VITAMIN D 25 HYDROXY: CPT | Performed by: FAMILY MEDICINE

## 2022-03-21 PROCEDURE — 36415 COLL VENOUS BLD VENIPUNCTURE: CPT | Performed by: FAMILY MEDICINE

## 2022-03-21 PROCEDURE — 99396 PREV VISIT EST AGE 40-64: CPT | Performed by: FAMILY MEDICINE

## 2022-03-21 PROCEDURE — 84443 ASSAY THYROID STIM HORMONE: CPT | Performed by: FAMILY MEDICINE

## 2022-03-21 PROCEDURE — 82947 ASSAY GLUCOSE BLOOD QUANT: CPT | Performed by: FAMILY MEDICINE

## 2022-03-21 PROCEDURE — 99214 OFFICE O/P EST MOD 30 MIN: CPT | Mod: 25 | Performed by: FAMILY MEDICINE

## 2022-03-21 RX ORDER — RIZATRIPTAN BENZOATE 10 MG/1
TABLET ORAL
Qty: 12 TABLET | Refills: 3 | Status: SHIPPED | OUTPATIENT
Start: 2022-03-21 | End: 2023-06-06

## 2022-03-21 RX ORDER — LEVOTHYROXINE SODIUM 75 UG/1
75 TABLET ORAL DAILY
Qty: 90 TABLET | Refills: 3 | Status: SHIPPED | OUTPATIENT
Start: 2022-03-21 | End: 2023-03-29

## 2022-03-21 RX ORDER — MULTIVIT-MIN/IRON/FOLIC ACID/K 18-600-40
1 CAPSULE ORAL DAILY
COMMUNITY

## 2022-03-21 NOTE — PATIENT INSTRUCTIONS
Consider keeping a dietary diary to see if you can identify triggers for your intermittent diarrhea.  Consider adding a fiber supplement and a probiotic.  If you continue struggle with diarrhea and fecal incontinence, you could consider evaluation by colon and rectal surgery.    Consider Shingrix (the new shingles vaccine, 2 dose series) to reduce your risk of shingles.  Check your insurance coverage first.     I recommend the COVID booster.  Preventive Health Recommendations  Female Ages 50 - 64    Yearly exam: See your health care provider every year in order to  o Review health changes.   o Discuss preventive care.    o Review your medicines if your doctor has prescribed any.      Get a Pap test every three years (unless you have an abnormal result and your provider advises testing more often).    If you get Pap tests with HPV test, you only need to test every 5 years, unless you have an abnormal result.     You do not need a Pap test if your uterus was removed (hysterectomy) and you have not had cancer.    You should be tested each year for STDs (sexually transmitted diseases) if you're at risk.     Have a mammogram every 1 to 2 years.    Have a colonoscopy at age 50, or have a yearly FIT test (stool test). These exams screen for colon cancer.      Have a cholesterol test every 5 years, or more often if advised.    Have a diabetes test (fasting glucose) every three years. If you are at risk for diabetes, you should have this test more often.     If you are at risk for osteoporosis (brittle bone disease), think about having a bone density scan (DEXA).    Shots: Get a flu shot each year. Get a tetanus shot every 10 years.    Nutrition:     Eat at least 5 servings of fruits and vegetables each day.    Eat whole-grain bread, whole-wheat pasta and brown rice instead of white grains and rice.    Get adequate Calcium and Vitamin D.     Lifestyle    Exercise at least 150 minutes a week (30 minutes a day, 5 days a week).  This will help you control your weight and prevent disease.    Limit alcohol to one drink per day.    No smoking.     Wear sunscreen to prevent skin cancer.     See your dentist every six months for an exam and cleaning.    See your eye doctor every 1 to 2 years.

## 2022-03-21 NOTE — PROGRESS NOTES
SUBJECTIVE:   CC: Bee Schulz is an 61 year old woman who presents for preventive health visit.     Patient has been advised of split billing requirements and indicates understanding: Yes  Now following primarily a vegetarian or vegan diet, doing well with this but noting intermittent diarrhea, sometimes explosive.  Notes GERD tends to trigger it, has not identified other triggering factors.  Intermittently will take probiotics which seems somewhat helpful.  No blood.  Has had some fecal smearing and incontinence at times.  It is not yet problematic for her.    History of hypothyroidism and nontoxic multinodular goiter, taking levothyroxine daily, feeling good balance.  Migraines have been stable, typically triggered by weather changes, Maxalt or Excedrin effective.  History of vitamin D deficiency due for follow-up, followed by Dr. Multani in management of osteopenia with moderate fracture risk, has been prescribed alendronate but is not taking, think she is going to wait on this.  History of borderline anemia, takes an iron supplement for this, wondering if this could be contributing to diarrhea, interested in evaluation of this.  Seeing a dermatologist due to history of basal cell carcinoma.    Healthy Habits:     Getting at least 3 servings of Calcium per day:  Yes    Bi-annual eye exam:  Yes    Dental care twice a year:  Yes    Sleep apnea or symptoms of sleep apnea:  None    Diet:  Regular (no restrictions) and Vegetarian/vegan    Frequency of exercise:  6-7 days/week    Duration of exercise:  30-45 minutes    Taking medications regularly:  Yes    PHQ-2 Total Score: 0    Additional concerns today:  Yes        Today's PHQ-2 Score:   PHQ-2 ( 1999 Pfizer) 3/20/2022   Q1: Little interest or pleasure in doing things 0   Q2: Feeling down, depressed or hopeless 0   PHQ-2 Score 0   Q1: Little interest or pleasure in doing things Not at all   Q2: Feeling down, depressed or hopeless Not at all   PHQ-2 Score 0        Have you ever done Advance Care Planning? (For example, a Health Directive, POLST, or a discussion with a medical provider or your loved ones about your wishes): Yes, patient states has an Advance Care Planning document and will bring a copy to the clinic.    Social History     Tobacco Use     Smoking status: Former Smoker     Smokeless tobacco: Never Used   Substance Use Topics     Alcohol use: Not on file         Alcohol Use 3/20/2022   Prescreen: >3 drinks/day or >7 drinks/week? No       Reviewed orders with patient.  Reviewed health maintenance and updated orders accordingly - Yes  Patient Active Problem List   Diagnosis     Nontoxic Multinodular Goiter     Hypothyroidism due to Hashimoto's thyroiditis     Vitamin D Deficiency     Gilbert's Syndrome     History of basal cell carcinoma     Migraines     No past surgical history on file.    Social History     Tobacco Use     Smoking status: Former Smoker     Smokeless tobacco: Never Used   Substance Use Topics     Alcohol use: Not on file     Family History   Problem Relation Age of Onset     Cancer Mother         skin cancer     Rheumatoid Arthritis Mother      Cancer Father         Lymphoma     Crohn's Disease Father      Heart Disease Maternal Grandfather      Heart Disease Paternal Grandmother      Diabetes Paternal Grandmother      Cancer Paternal Grandmother         oral/throat     Hypertension Paternal Grandmother      Breast Cancer No family hx of          Current Outpatient Medications   Medication Sig Dispense Refill     alendronate (FOSAMAX) 70 MG tablet Take 1 tablet (70 mg) by mouth every 7 days 12 tablet 3     Ascorbic Acid (VITAMIN C) 500 MG CAPS        aspirin-acetaminophen-caffeine (EXCEDRIN MIGRAINE) 250-250-65 mg per tablet [ASPIRIN-ACETAMINOPHEN-CAFFEINE (EXCEDRIN MIGRAINE) 250-250-65 MG PER TABLET] Take 1 tablet by mouth every 6 (six) hours as needed for pain.       calcium carbonate (OS-CRAIG) 600 mg (1,500 mg) tablet [CALCIUM CARBONATE  (OS-CRAIG) 600 MG (1,500 MG) TABLET] Take 600 mg by mouth daily. Plus D 400mg       cholecalciferol, vitamin D3, (VITAMIN D3) 2,000 unit cap [CHOLECALCIFEROL, VITAMIN D3, (VITAMIN D3) 2,000 UNIT CAP] Take 2,000 Units by mouth daily.       ferrous sulfate 325 (65 FE) MG tablet [FERROUS SULFATE 325 (65 FE) MG TABLET] Take 1 tablet (325 mg total) by mouth 2 (two) times a day. 60 tablet 3     glucosamine-chondroitin 500-400 mg tablet [GLUCOSAMINE-CHONDROITIN 500-400 MG TABLET] Take 1 tablet by mouth 3 (three) times a day.       levothyroxine (SYNTHROID, LEVOTHROID) 75 MCG tablet [LEVOTHYROXINE (SYNTHROID, LEVOTHROID) 75 MCG TABLET] Take 1 tablet (75 mcg total) by mouth Daily at 6:00 am. 90 tablet 3     magnesium 30 mg tablet [MAGNESIUM 30 MG TABLET] Take 250 mg by mouth daily.       naproxen (NAPROSYN) 500 MG tablet [NAPROXEN (NAPROSYN) 500 MG TABLET] Take 500 mg by mouth 2 (two) times a day with meals.       OMEGA-3/DHA/EPA/FISH OIL (FISH OIL-OMEGA-3 FATTY ACIDS) 300-1,000 mg capsule [OMEGA-3/DHA/EPA/FISH OIL (FISH OIL-OMEGA-3 FATTY ACIDS) 300-1,000 MG CAPSULE] Take 1,200 mg by mouth daily. Takes when needed       rizatriptan (MAXALT) 10 MG tablet TAKE 1 TABLET BY MOUTH AT ONSET OF HEADACHE. MAY REPEAT IN 2 HOURS AS NEEDED. MAXIMUM OF 3 TABLETS PER 24 HOURS 12 tablet 1     scopolamine (TRANSDERM-SCOP) 1.5 mg transdermal patch [SCOPOLAMINE (TRANSDERM-SCOP) 1.5 MG TRANSDERMAL PATCH] Place 1 patch on the skin every third day. 6 patch 1     Allergies   Allergen Reactions     Penicillins Unknown     Sulfa (Sulfonamide Antibiotics) [Sulfa Drugs] Rash       Breast Cancer Screening:    FHS-7:   Breast CA Risk Assessment (FHS-7) 3/20/2022   Did any of your first-degree relatives have breast or ovarian cancer? Yes   Did any of your relatives have bilateral breast cancer? Unknown   Did any man in your family have breast cancer? No   Did any woman in your family have breast and ovarian cancer? Yes   Did any woman in your family have  "breast cancer before age 50 y? No   Do you have 2 or more relatives with breast and/or ovarian cancer? No   Do you have 2 or more relatives with breast and/or bowel cancer? No       Mammogram Screening: Recommended mammography every 1-2 years with patient discussion and risk factor consideration.  We specifically discussed yearly screening due to ovarian cancer in maternal aunt, breast cancer in maternal cousin of same family  Pertinent mammograms are reviewed under the imaging tab.    History of abnormal Pap smear: NO - age 30-65 PAP every 5 years with negative HPV co-testing recommended.  [facility]ce[facility] at [facility].  Will be scheduling a visit.     Reviewed and updated as needed this visit by clinical staff   Tobacco  Allergies  Meds              Reviewed and updated as needed this visit by Provider                  Review of Systems  CONSTITUTIONAL: NEGATIVE for fever, chills, change in weight  INTEGUMENTARU/SKIN: NEGATIVE for worrisome rashes, moles or lesions  EYES: NEGATIVE for vision changes or irritation  ENT: NEGATIVE for ear, mouth and throat problems  RESP: NEGATIVE for significant cough or SOB  BREAST: NEGATIVE for masses, tenderness or discharge  CV: NEGATIVE for chest pain, palpitations or peripheral edema  GI: NEGATIVE for nausea, abdominal pain, heartburn, or change in bowel habits  : NEGATIVE for unusual urinary or vaginal symptoms. Periods are regular.  MUSCULOSKELETAL: NEGATIVE for significant arthralgias or myalgia  NEURO: NEGATIVE for weakness, dizziness or paresthesias  PSYCHIATRIC: NEGATIVE for changes in mood or affect     OBJECTIVE:   /64   Pulse 62   Temp 97  F (36.1  C) (Tympanic)   Resp 16   Ht 1.588 m (5' 2.5\")   Wt 56.7 kg (125 lb)   SpO2 98%   BMI 22.50 kg/m    Physical Exam  GENERAL APPEARANCE: healthy, alert and no distress  EYES: Eyes grossly normal to inspection, PERRL and conjunctivae and sclerae normal  HENT: ear canals and TM's normal, nose and mouth " without ulcers or lesions, oropharynx clear and oral mucous membranes moist  NECK: no adenopathy, no asymmetry, masses, or scars and thyroid normal to palpation  RESP: lungs clear to auscultation - no rales, rhonchi or wheezes  BREAST: normal without masses, tenderness or nipple discharge and no palpable axillary masses or adenopathy  CV: regular rate and rhythm, normal S1 S2, no S3 or S4, no murmur, click or rub, no peripheral edema and peripheral pulses strong  ABDOMEN: soft, nontender, no hepatosplenomegaly, no masses and bowel sounds normal  MS: no musculoskeletal defects are noted and gait is age appropriate without ataxia  SKIN: no suspicious lesions or rashes; 1.5 cm well demarcated hyperpigmented flat lesion left breast, stable  NEURO: Normal strength and tone, sensory exam grossly normal, mentation intact and speech normal  PSYCH: mentation appears normal and affect normal/bright      ASSESSMENT/PLAN:     Routine physical examination  Encouraged healthy lifestyle habits including regular exercise, healthy eating habits, and adequate calcium and vitamin D intake.  She has a mammogram scheduled later today.  She will receive a follow-up Pap smear [place] [facility] as last one was with insufficient cells.  Up-to-date with colon cancer screening.  She does not qualify for lung cancer screening.  Will obtain screening lipids and screening glucose.  Offered Shingrix and COVID booster, she declines today.  - Lipid panel reflex to direct LDL Fasting; Future  - Glucose; Future  - Lipid panel reflex to direct LDL Fasting  - Glucose    Hypothyroidism due to Hashimoto's thyroiditis  Stable on levothyroxine daily.  Continue.  We will check follow-up TSH today.  - TSH; Future  - TSH    Nontoxic multinodular goiter  This remains asymptomatic.  We will check follow-up TSH today.  - TSH; Future  - TSH    Vitamin D deficiency  Encouraged daily supplement.  Will check vitamin D level today.  - Vitamin D Deficiency;  "Future  - Vitamin D Deficiency    Migraine without status migrainosus, not intractable, unspecified migraine type  Stable, responsive to rizatriptan, refill provided.    History of iron deficiency  We will obtain follow-up hemoglobin today to reassess.  She is going to hold her iron supplement as this could be contributing to her GI symptoms.  - Hemoglobin; Future  - Hemoglobin    Diarrhea, unspecified type  We discussed differential diagnosis.  I recommended dietary diary to see if she can identify triggers.  Consider fiber supplement and probiotic.  If persistent, would consider further evaluation.    Incontinence of feces, unspecified fecal incontinence type  This is mild, not problematic for her currently.  Advise colorectal surgery assessment should her symptoms persist or become bothersome.    Osteopenia, unspecified location  She will continue to work with osteoporosis team.    Patient has been advised of split billing requirements and indicates understanding: Yes    COUNSELING:  Reviewed preventive health counseling, as reflected in patient instructions    Estimated body mass index is 22.5 kg/m  as calculated from the following:    Height as of this encounter: 1.588 m (5' 2.5\").    Weight as of this encounter: 56.7 kg (125 lb).        She reports that she has quit smoking. She has never used smokeless tobacco.      Counseling Resources:  ATP IV Guidelines  Pooled Cohorts Equation Calculator  Breast Cancer Risk Calculator  BRCA-Related Cancer Risk Assessment: FHS-7 Tool  FRAX Risk Assessment  ICSI Preventive Guidelines  Dietary Guidelines for Americans, 2010  USDA's MyPlate  ASA Prophylaxis  Lung CA Screening    Katina Alicia MD  [facility]  "

## 2022-03-22 LAB — DEPRECATED CALCIDIOL+CALCIFEROL SERPL-MC: 60 UG/L (ref 30–80)

## 2022-05-04 ENCOUNTER — TELEPHONE (OUTPATIENT)
Dept: FAMILY MEDICINE | Facility: CLINIC | Age: 62
End: 2022-05-04
Payer: COMMERCIAL

## 2022-05-04 DIAGNOSIS — M85.88 OSTEOPENIA OF SPINE: Primary | ICD-10-CM

## 2022-05-04 NOTE — TELEPHONE ENCOUNTER
Reason for Call: Request for an order or referral:    Order or referral being requested: Order    Date needed: at your convenience    Has the patient been seen by the PCP for this problem? Not Applicable    Additional comments: Pt is requesting Dexa scan , please advise. She is seeing Dr Multani 07/05/22    Phone number Patient can be reached at:  Home number on file [phone] (home)    Best Time:  any    Can we leave a detailed message on this number?  Not Applicable    Call taken on 5/4/2022 at 11:59 AM by Augie Malagon

## 2022-08-31 ENCOUNTER — HOSPITAL ENCOUNTER (OUTPATIENT)
Dept: BONE DENSITY | Facility: HOSPITAL | Age: 62
Discharge: HOME | End: 2022-08-31
Attending: INTERNAL MEDICINE | Admitting: INTERNAL MEDICINE
Payer: COMMERCIAL

## 2022-08-31 DIAGNOSIS — M85.88 OSTEOPENIA OF SPINE: ICD-10-CM

## 2022-08-31 PROCEDURE — 77080 DXA BONE DENSITY AXIAL: CPT

## 2022-09-06 ENCOUNTER — OFFICE VISIT (OUTPATIENT)
Dept: INTERNAL MEDICINE | Facility: CLINIC | Age: 62
End: 2022-09-06
Payer: COMMERCIAL

## 2022-09-06 VITALS
HEART RATE: 71 BPM | SYSTOLIC BLOOD PRESSURE: 98 MMHG | BODY MASS INDEX: 22.01 KG/M2 | DIASTOLIC BLOOD PRESSURE: 56 MMHG | WEIGHT: 122.3 LBS | OXYGEN SATURATION: 97 %

## 2022-09-06 DIAGNOSIS — E06.3 HYPOTHYROIDISM DUE TO HASHIMOTO'S THYROIDITIS: Primary | ICD-10-CM

## 2022-09-06 DIAGNOSIS — M81.8 IDIOPATHIC OSTEOPOROSIS: ICD-10-CM

## 2022-09-06 PROCEDURE — 99214 OFFICE O/P EST MOD 30 MIN: CPT | Performed by: INTERNAL MEDICINE

## 2022-09-06 RX ORDER — ALENDRONATE SODIUM 70 MG/1
70 TABLET ORAL
Qty: 12 TABLET | Refills: 3 | Status: SHIPPED | OUTPATIENT
Start: 2022-09-06 | End: 2023-01-04

## 2022-09-06 NOTE — PROGRESS NOTES
Assessment & Plan     Idiopathic osteoporosis  DXA 8/31/22:  Lumbar Spine: L1-L4: BMD: 0.880 g/cm2. T-score: -2.5. Z-score: -1.2  RIGHT Hip Total: BMD: 0.834 g/cm2. T-score: -1.4. Z-score: -0.4  RIGHT Hip Femoral neck: BMD: 0.813 g/cm2. T-score: -1.6. Z-score: -0.3  LEFT Hip Total: BMD: 0.892 g/cm2. T-score: -0.9. Z-score: 0.0  LEFT Hip Femoral neck: BMD: 0.824 g/cm2. T-score: -1.5. Z-score: -0.3  COMPARISON: There has been a 1.6 % decrease in lumbar spine BMD. There has been a 1.5 % decrease in left hip BMD. There has been a 3.4 % decrease in right hip BMD.    Component      Latest Ref Rng & Units 3/21/2022   Vitamin D Deficiency screening      30 - 80 ug/L 60       Patient did not start Fosamax last year and is agreeable to try it now.  Side effects and appropriate use dicussed and printed info provided.  Supplements and weightbearing exercise discussed.    Hypothyroidism due to Hashimoto's thyroiditis  Stable on levothyroxine.  Component      Latest Ref Rng & Units 3/21/2022   TSH      0.30 - 5.00 uIU/mL 2.07       Excessive thyroid hormone causes osteoporosis and fractures. This can occur when hyperthyroid disease goes untreated or patient is taking too much levothyroxine. One study of women with osteoporosis concluded that taking a high dose of thyroid hormone (>150 micrograms/day) increased their fracture risk more than a 50%.         Return in about 1 year (around 9/6/2023) for Follow up, osteoporosis.    Loc Multani MD  [facility]    Radha Gamboa is a 62 year old, presenting for the following health issues:  Follow Up (Discuss osteoporosis treatment)      HPI           Review of Systems         Objective    BP 98/56 (BP Location: Right arm, Patient Position: Sitting, Cuff Size: Adult Regular)   Pulse 71   Wt 55.5 kg (122 lb 4.8 oz)   SpO2 97%   BMI 22.01 kg/m    Body mass index is 22.01 kg/m .  Physical Exam                        @[email or web address]@  @[facility]@

## 2022-09-06 NOTE — PATIENT INSTRUCTIONS
Treatment Options discussed:   Bisphosphonates  Oral - Alendronate (Fosamax) once weekly for 2-5 years  IV - Zoledronic acid (Reclast) once a year for 3 years total     -Discussed that studies have shown that alendronate for 5 years will protect the bones for an additional 5 years nearly as much as being onalendronate for 10 years straight (only a slight increase in asymptomatic vertebral fractures, no increase in symptomatic fractures).     -discussed studies have shown that three years of zolendronic acid protects forthe following 3 years as much as being on zolendronic acid for 6 years straight      GI side effects of the oral bisphosphonates include reflux, esophagitis (inflammation of the esophagus) and ulcers. You should takethe medication first thing in the morning, on an empty stomach with a full glass of water and wait 60 minutes to eat.     Flu-like symptoms can occur within the first 24-72 hours of your first infusion of the IVbisphosphonate. This includes low-grade fever, muscle and joint pains. Ibuprofen and/or Tylenol can help these symptoms. Low calcium levels can also occur with the IV bisphosphonates.     Osteonecrosis of the jaw(ONJ) has been rarely associated with bisphosphonate use for osteoporosis.The risk is approximately 1/1700-1/100,000, with development most likely related to invasive dental procedures (extractions, dental implants) and poordental hygiene. Be sure to continue regular dental visits and alert me and/or your dentist for any issues. If you do require invasive dental work, please contact me and we will stop the medication 3 months prior.      -The data available at this time suggests that there is probably a small increase risk of atypical (nontraumatic) subtrochanteric fractures of the femur in patients on bisphosphonate therapy compared to those not onit. One large study suggested that for every 100 fractures prevented with bisphosphonate therapy, less than one femur fracture  will occur. Other studies suggest one episode per 2,500 patient years. Patient should call withleg pain.     -Calcium: total calcium intake should be 1200mg per day from dietary sources. If not achieved with diet alone, add in calcium tablet(s). No more than 500mg at one time. Dietary sources: 8 ounces of milk,4 ounces of yogurt or 1 ounce of cheese contain about 300mg calcium per serving, green veggies, almonds, beans, oranges, along with various other plant sources.     -Vitamin D3 recommendations: 2000 IU daily.    -Bone Stimulating Exercise: impact and weight-bearing exercise like walking, jogging, yoga, [place], stair-climbing, dancing, dry aerobics, and tennis 3-5 times per week for at least 30 minutes & weight-lifting orresistance training 2 times per week, may improve your bone desity and increase your strength, thereby decreasing your risk of fracture.    -Dr. Grisel Serna has a book on Yoga for Osteoporosis and a 12 pose regimenyou can look up online.     -Fall Prevention: avoiding ice, use of Drugstore.com[employer] Tracks http://www.Sway/ to cover shoes in the winter, avoiding loose gravel and uneven terrain, clearing house of cords, throw rugs,avoiding ladders, using caution with stairs and around dogs and small children.

## 2022-09-25 ENCOUNTER — HEALTH MAINTENANCE LETTER (OUTPATIENT)
Age: 62
End: 2022-09-25

## 2023-01-04 ENCOUNTER — TELEPHONE (OUTPATIENT)
Dept: INTERNAL MEDICINE | Facility: CLINIC | Age: 63
End: 2023-01-04

## 2023-01-04 ENCOUNTER — VIRTUAL VISIT (OUTPATIENT)
Dept: INTERNAL MEDICINE | Facility: CLINIC | Age: 63
End: 2023-01-04
Payer: COMMERCIAL

## 2023-01-04 DIAGNOSIS — M81.8 IDIOPATHIC OSTEOPOROSIS: ICD-10-CM

## 2023-01-04 DIAGNOSIS — M81.8 IDIOPATHIC OSTEOPOROSIS: Primary | ICD-10-CM

## 2023-01-04 PROCEDURE — 99213 OFFICE O/P EST LOW 20 MIN: CPT | Mod: 95 | Performed by: INTERNAL MEDICINE

## 2023-01-04 RX ORDER — RISEDRONATE SODIUM 35 MG/1
35 TABLET, FILM COATED ORAL
Qty: 12 TABLET | Refills: 3 | Status: SHIPPED | OUTPATIENT
Start: 2023-01-04 | End: 2023-01-04

## 2023-01-04 RX ORDER — RISEDRONATE SODIUM 35 MG/1
35 TABLET, FILM COATED ORAL
Qty: 12 TABLET | Refills: 3 | Status: SHIPPED | OUTPATIENT
Start: 2023-01-04 | End: 2023-05-01

## 2023-01-04 NOTE — PROGRESS NOTES
Bee is a 62 year old who is being evaluated via a billable video visit.      How would you like to obtain your AVS? MyChart  If the video visit is dropped, the invitation should be resent by: Text to cell phone: [phone]  Will anyone else be joining your video visit? No          Assessment & Plan     Idiopathic osteoporosis  DXA 8/31/22:  Lumbar Spine: L1-L4: BMD: 0.880 g/cm2. T-score: -2.5. Z-score: -1.2  RIGHT Hip Total: BMD: 0.834 g/cm2. T-score: -1.4. Z-score: -0.4  RIGHT Hip Femoral neck: BMD: 0.813 g/cm2. T-score: -1.6. Z-score: -0.3  LEFT Hip Total: BMD: 0.892 g/cm2. T-score: -0.9. Z-score: 0.0  LEFT Hip Femoral neck: BMD: 0.824 g/cm2. T-score: -1.5. Z-score: -0.3  COMPARISON: There has been a 1.6 % decrease in lumbar spine BMD. There has been a 1.5 % decrease in left hip BMD. There has been a 3.4 % decrease in right hip BMD.     Patient started Fosamax in September. She took if for 2 months and had significant muscle and bone pain, as also chest tightness. Since she stopped taking it after 2 months of treatment, all her symptoms resolved.  We discussed all other treatment options but she does not want any injectable meds.  We talked about Actonel and Evista.  She wants to try Actonel and will let me know if cannot tolerate it.  - RISEdronate (ACTONEL) 35 MG tablet; Take 1 tablet (35 mg) by mouth every 30 days                 Return in about 1 year (around 1/4/2024) for Follow up.    Loc Multani MD  [facility]   Bee is a 62 year old, presenting for the following health issues:  Follow Up (Osteoporosis meds)      HPI           Review of Systems         Objective           Vitals:  No vitals were obtained today due to virtual visit.    Physical Exam                   Video-Visit Details    Type of service:  Video Visit     Originating Location (pt. Location): Home    Distant Location (provider location):  On-site  Platform used for Video Visit:  AmWell

## 2023-01-04 NOTE — TELEPHONE ENCOUNTER
General Call      Reason for Call:  Dosage question on Rx    What are your questions or concerns:  Pharmacy called and stated she has questions on the instructions on the below medication. The instructions state to take once a month but Pharmacist states this medication is usually taken once a week. Please call pharmacy to clarify instructions.       Disp Refills Start End SHYANNE   RISEdronate (ACTONEL) 35 MG tablet 12 tablet 3 1/4/2023  --   Sig - Route: Take 1 tablet (35 mg) by mouth every 30 days - Oral       Date of last appointment with provider: 01/04/2023 virtual visit    Misa Jones

## 2023-03-27 ASSESSMENT — ENCOUNTER SYMPTOMS
JOINT SWELLING: 0
WEAKNESS: 0
FREQUENCY: 0
ARTHRALGIAS: 0
SORE THROAT: 0
CHILLS: 0
COUGH: 0
SHORTNESS OF BREATH: 0
DIZZINESS: 0
NAUSEA: 0
BREAST MASS: 0
HEMATURIA: 0
HEADACHES: 0
NERVOUS/ANXIOUS: 0
ABDOMINAL PAIN: 0
FEVER: 0
PARESTHESIAS: 0
HEARTBURN: 0
MYALGIAS: 0
PALPITATIONS: 0
HEMATOCHEZIA: 0
DYSURIA: 0
CONSTIPATION: 0
EYE PAIN: 0
DIARRHEA: 0

## 2023-03-29 ENCOUNTER — OFFICE VISIT (OUTPATIENT)
Dept: FAMILY MEDICINE | Facility: CLINIC | Age: 63
End: 2023-03-29
Payer: COMMERCIAL

## 2023-03-29 VITALS
SYSTOLIC BLOOD PRESSURE: 98 MMHG | RESPIRATION RATE: 16 BRPM | TEMPERATURE: 98.1 F | DIASTOLIC BLOOD PRESSURE: 60 MMHG | OXYGEN SATURATION: 98 % | WEIGHT: 129.9 LBS | HEIGHT: 63 IN | BODY MASS INDEX: 23.02 KG/M2 | HEART RATE: 60 BPM

## 2023-03-29 DIAGNOSIS — T75.3XXA MOTION SICKNESS, INITIAL ENCOUNTER: ICD-10-CM

## 2023-03-29 DIAGNOSIS — E80.6 DISORDER OF BILIRUBIN EXCRETION: ICD-10-CM

## 2023-03-29 DIAGNOSIS — Z00.00 ROUTINE PHYSICAL EXAMINATION: Primary | ICD-10-CM

## 2023-03-29 DIAGNOSIS — E06.3 HYPOTHYROIDISM DUE TO HASHIMOTO'S THYROIDITIS: ICD-10-CM

## 2023-03-29 DIAGNOSIS — E55.9 VITAMIN D DEFICIENCY: ICD-10-CM

## 2023-03-29 DIAGNOSIS — Z85.828 HISTORY OF BASAL CELL CARCINOMA: ICD-10-CM

## 2023-03-29 DIAGNOSIS — E04.2 NONTOXIC MULTINODULAR GOITER: ICD-10-CM

## 2023-03-29 DIAGNOSIS — G43.909 MIGRAINE WITHOUT STATUS MIGRAINOSUS, NOT INTRACTABLE, UNSPECIFIED MIGRAINE TYPE: ICD-10-CM

## 2023-03-29 DIAGNOSIS — M81.8 IDIOPATHIC OSTEOPOROSIS: ICD-10-CM

## 2023-03-29 LAB
ALBUMIN SERPL BCG-MCNC: 4.6 G/DL (ref 3.5–5.2)
ALP SERPL-CCNC: 48 U/L (ref 35–104)
ALT SERPL W P-5'-P-CCNC: 11 U/L (ref 10–35)
ANION GAP SERPL CALCULATED.3IONS-SCNC: 11 MMOL/L (ref 7–15)
AST SERPL W P-5'-P-CCNC: 18 U/L (ref 10–35)
BILIRUB SERPL-MCNC: 1.4 MG/DL
BUN SERPL-MCNC: 12.8 MG/DL (ref 8–23)
CALCIUM SERPL-MCNC: 9.3 MG/DL (ref 8.8–10.2)
CHLORIDE SERPL-SCNC: 102 MMOL/L (ref 98–107)
CHOLEST SERPL-MCNC: 223 MG/DL
CREAT SERPL-MCNC: 0.86 MG/DL (ref 0.51–0.95)
DEPRECATED HCO3 PLAS-SCNC: 27 MMOL/L (ref 22–29)
ERYTHROCYTE [DISTWIDTH] IN BLOOD BY AUTOMATED COUNT: 12.2 % (ref 10–15)
GFR SERPL CREATININE-BSD FRML MDRD: 76 ML/MIN/1.73M2
GLUCOSE SERPL-MCNC: 93 MG/DL (ref 70–99)
HCT VFR BLD AUTO: 37.2 % (ref 35–47)
HDLC SERPL-MCNC: 80 MG/DL
HGB BLD-MCNC: 13.2 G/DL (ref 11.7–15.7)
LDLC SERPL CALC-MCNC: 128 MG/DL
MCH RBC QN AUTO: 30.2 PG (ref 26.5–33)
MCHC RBC AUTO-ENTMCNC: 35.5 G/DL (ref 31.5–36.5)
MCV RBC AUTO: 85 FL (ref 78–100)
NONHDLC SERPL-MCNC: 143 MG/DL
PLATELET # BLD AUTO: 251 10E3/UL (ref 150–450)
POTASSIUM SERPL-SCNC: 3.8 MMOL/L (ref 3.4–5.3)
PROT SERPL-MCNC: 7 G/DL (ref 6.4–8.3)
RBC # BLD AUTO: 4.37 10E6/UL (ref 3.8–5.2)
SODIUM SERPL-SCNC: 140 MMOL/L (ref 136–145)
TRIGL SERPL-MCNC: 73 MG/DL
TSH SERPL DL<=0.005 MIU/L-ACNC: 2.26 UIU/ML (ref 0.3–4.2)
WBC # BLD AUTO: 5.7 10E3/UL (ref 4–11)

## 2023-03-29 PROCEDURE — 36415 COLL VENOUS BLD VENIPUNCTURE: CPT | Performed by: FAMILY MEDICINE

## 2023-03-29 PROCEDURE — 99214 OFFICE O/P EST MOD 30 MIN: CPT | Mod: 25 | Performed by: FAMILY MEDICINE

## 2023-03-29 PROCEDURE — 80053 COMPREHEN METABOLIC PANEL: CPT | Performed by: FAMILY MEDICINE

## 2023-03-29 PROCEDURE — 99396 PREV VISIT EST AGE 40-64: CPT | Performed by: FAMILY MEDICINE

## 2023-03-29 PROCEDURE — 82306 VITAMIN D 25 HYDROXY: CPT | Performed by: FAMILY MEDICINE

## 2023-03-29 PROCEDURE — 80061 LIPID PANEL: CPT | Performed by: FAMILY MEDICINE

## 2023-03-29 PROCEDURE — 85027 COMPLETE CBC AUTOMATED: CPT | Performed by: FAMILY MEDICINE

## 2023-03-29 PROCEDURE — 84443 ASSAY THYROID STIM HORMONE: CPT | Performed by: FAMILY MEDICINE

## 2023-03-29 RX ORDER — SCOLOPAMINE TRANSDERMAL SYSTEM 1 MG/1
1 PATCH, EXTENDED RELEASE TRANSDERMAL
Qty: 3 PATCH | Refills: 0 | Status: SHIPPED | OUTPATIENT
Start: 2023-03-29 | End: 2023-06-06

## 2023-03-29 RX ORDER — LEVOTHYROXINE SODIUM 75 UG/1
75 TABLET ORAL DAILY
Qty: 90 TABLET | Refills: 4 | Status: SHIPPED | OUTPATIENT
Start: 2023-03-29 | End: 2024-04-02

## 2023-03-29 ASSESSMENT — PAIN SCALES - GENERAL: PAINLEVEL: MILD PAIN (2)

## 2023-03-29 NOTE — PROGRESS NOTES
Assessment/Plan:     Routine physical examination  Encouraged healthy lifestyle habits including regular exercise, healthy eating habits, and adequate calcium and vitamin D intake.  Advised COVID and flu vaccines today, she declines.  Advised Tdap and Shingrix, she will check insurance coverage.  Up-to-date with routine cancer screening including mammogram, Pap smear, both done at [facility], and colon cancer screening.  We will check screening lipids and screening glucose today.  - Lipid panel reflex to direct LDL Fasting; Future    Hypothyroidism due to Hashimoto's thyroiditis  Clinically euthyroid.  Continue levothyroxine.  We will check thyroid cascade today.  - levothyroxine (SYNTHROID/LEVOTHROID) 75 MCG tablet; Take 1 tablet (75 mcg) by mouth daily  - TSH WITH FREE T4 REFLEX; Future    Nontoxic multinodular goiter  This remains asymptomatic.    Vitamin D deficiency  We will check vitamin D level today.  - Vitamin D Deficiency; Future    Migraine without status migrainosus, not intractable, unspecified migraine type  Stable, Maxalt as needed.    Gilbert's Syndrome  Stable, will follow liver function today.  - Comprehensive metabolic panel; Future    Idiopathic osteoporosis  She continues to work with Dr. Multani.  Side effects from Actonel, I recommend that she discontinue this.  She will check CBC to ensure she is not having occult blood loss from an occult ulcer though unlikely.  Advised that she work with Dr. Multani regarding alternatives.  - CBC with platelets; Future    History of basal cell carcinoma  She continues to follow with dermatology.    Motion sickness, initial encounter  Prescription provided for scopolamine transdermal patch for motion sickness.  Discussed availability of meclizine as well as an alternative.  - scopolamine (TRANSDERM) 1 MG/3DAYS 72 hr patch; Place 1 patch onto the skin every 72 hours       Patient has been advised of split billing requirements and indicates  understanding: Yes    Patient Instructions   I recommend considering tetanus booster, shingles vaccine, COVID booster, and flu shot.  Check your insurance coverage as these are often covered better at a pharmacy than at a clinic.    I recommend stopping Actonel due to side effects.  Let Dr. Multani know she may have recommendations for next steps.    Preventive Health Recommendations  Female Ages 50 - 64    Yearly exam: See your health care provider every year in order to  o Review health changes.   o Discuss preventive care.    o Review your medicines if your doctor has prescribed any.      Get a Pap test every three years (unless you have an abnormal result and your provider advises testing more often).    If you get Pap tests with HPV test, you only need to test every 5 years, unless you have an abnormal result.     You do not need a Pap test if your uterus was removed (hysterectomy) and you have not had cancer.    You should be tested each year for STDs (sexually transmitted diseases) if you're at risk.     Have a mammogram every 1 to 2 years.    Have a colonoscopy at age 50, or have a yearly FIT test (stool test). These exams screen for colon cancer.      Have a cholesterol test every 5 years, or more often if advised.    Have a diabetes test (fasting glucose) every three years. If you are at risk for diabetes, you should have this test more often.     If you are at risk for osteoporosis (brittle bone disease), think about having a bone density scan (DEXA).    Shots: Get a flu shot each year. Get a tetanus shot every 10 years.    Nutrition:     Eat at least 5 servings of fruits and vegetables each day.    Eat whole-grain bread, whole-wheat pasta and brown rice instead of white grains and rice.    Get adequate Calcium and Vitamin D.     Lifestyle    Exercise at least 150 minutes a week (30 minutes a day, 5 days a week). This will help you control your weight and prevent disease.    Limit alcohol to one drink  "per day.    No smoking.     Wear sunscreen to prevent skin cancer.     See your dentist every six months for an exam and cleaning.    See your eye doctor every 1 to 2 years.         No follow-ups on file.           Subjective:     Bee Schulz is a 62 year old female who presents for an annual exam.     Traveling on a 1 week [employer] cruise this summer, requesting scopolamine patch which has been helpful in the past for her motion sickness.  Started on underneath in management of osteoporosis by Dr. Multani, noted significant side effects including chest discomfort, symptoms resolved with discontinuation.  She started Actonel instead, has been on it for 3 months and is now developing similar anterior chest discomfort as though there is something stuck like pasta.  States it is \"not horrible\" without any burning or belching or regurgitation.  It is nonexertional.  Sometimes radiates to her back.  No associated symptoms.    Remains on levothyroxine and management of hypothyroidism.  Her goiter has remained asymptomatic.  Taking a vitamin D supplement due to prior deficiency.  History of anemia as well, feels best when she takes iron every other day and so continues this.  Using Maxalt for migraines, perhaps once or twice a month on average.  History of Gilbert syndrome asymptomatic.  History of basal cell carcinoma and so followed by dermatology.    Exercising regularly by working out and feeling well with this.  Eating healthy.  Currently working in the mornings at the [employer].    Healthy Habits:     Getting at least 3 servings of Calcium per day:  Yes    Bi-annual eye exam:  Yes    Dental care twice a year:  Yes    Sleep apnea or symptoms of sleep apnea:  None    Diet:  Regular (no restrictions) and Vegetarian/vegan    Frequency of exercise:  4-5 days/week    Duration of exercise:  45-60 minutes    Taking medications regularly:  Yes    Medication side effects:  Other    PHQ-2 Total Score: " 2      Healthy Habits:   Healthy Diet: Yes  Regular Exercise: Yes  Sunscreen Use: Yes  Dental Visits Regularly: Yes  Seat Belt: Yes  Domestic abuse:   No    Health Maintenance reviewed:  Lipid Profile: Due today  Glucose Screen: Due today  Colonoscopy: 2/22/2021 negative Cologuard  Mammogram: Last week, also 3/21/2022, at [facility]    Gynecologic History  No LMP recorded. Patient is postmenopausal.  Contraception: post menopausal status  Last Pap: 2/11/2021. Results were: Normal and negative, [facility]        Immunization History   Administered Date(s) Administered     COVID-19 Vaccine 18+ (Moderna) 04/28/2021, 05/26/2021     DT (PEDS <7y) 10/23/2003     Flu, Unspecified 10/20/2007, 11/06/2008, 10/05/2016, 10/20/2020     Influenza (IIV3) PF 11/19/2005, 10/28/2006, 10/20/2007, 11/06/2008, 09/26/2009, 10/21/2010, 11/09/2011, 10/26/2012, 10/17/2013     Influenza Vaccine >6 months (Alfuria,Fluzone) 10/08/2016, 10/12/2017, 10/20/2020, 10/02/2021     Influenza Vaccine, 6+MO IM (QUADRIVALENT W/PRESERVATIVES) 09/26/2009, 10/21/2010, 11/09/2011, 10/26/2012, 10/17/2013, 10/21/2017, 10/25/2018, 11/04/2019     TDAP (Adacel,Boostrix) 06/14/2012     Td (Adult), Adsorbed 10/23/2003     Immunization status: Eligible for Shingrix, Tdap, COVID booster, flu vaccine    Current Outpatient Medications   Medication Sig Dispense Refill     Ascorbic Acid (VITAMIN C) 500 MG CAPS Take 1 tablet by mouth daily       aspirin-acetaminophen-caffeine (EXCEDRIN MIGRAINE) 250-250-65 mg per tablet [ASPIRIN-ACETAMINOPHEN-CAFFEINE (EXCEDRIN MIGRAINE) 250-250-65 MG PER TABLET] Take 1 tablet by mouth every 6 (six) hours as needed for pain.       calcium carbonate (OS-CRAIG) 600 mg (1,500 mg) tablet [CALCIUM CARBONATE (OS-CRAIG) 600 MG (1,500 MG) TABLET] Take 600 mg by mouth daily. Plus D 400mg       cholecalciferol, vitamin D3, (VITAMIN D3) 2,000 unit cap Take 4,000 Units by mouth daily       glucosamine-chondroitin 500-400 mg tablet  [GLUCOSAMINE-CHONDROITIN 500-400 MG TABLET] Take 1 tablet by mouth 3 (three) times a day.       levothyroxine (SYNTHROID/LEVOTHROID) 75 MCG tablet Take 1 tablet (75 mcg) by mouth daily 90 tablet 4     magnesium 30 mg tablet [MAGNESIUM 30 MG TABLET] Take 250 mg by mouth daily.       naproxen (NAPROSYN) 500 MG tablet [NAPROXEN (NAPROSYN) 500 MG TABLET] Take 500 mg by mouth 2 (two) times a day with meals.       OMEGA-3/DHA/EPA/FISH OIL (FISH OIL-OMEGA-3 FATTY ACIDS) 300-1,000 mg capsule [OMEGA-3/DHA/EPA/FISH OIL (FISH OIL-OMEGA-3 FATTY ACIDS) 300-1,000 MG CAPSULE] Take 1,200 mg by mouth daily. Takes when needed       RISEdronate (ACTONEL) 35 MG tablet Take 1 tablet (35 mg) by mouth every 7 days 12 tablet 3     rizatriptan (MAXALT) 10 MG tablet TAKE 1 TABLET BY MOUTH AT ONSET OF HEADACHE. MAY REPEAT IN 2 HOURS AS NEEDED. MAXIMUM OF 3 TABLETS PER 24 HOURS 12 tablet 3     scopolamine (TRANSDERM) 1 MG/3DAYS 72 hr patch Place 1 patch onto the skin every 72 hours 3 patch 0     No past medical history on file.  No past surgical history on file.  Penicillins and Sulfa (sulfonamide antibiotics) [sulfa drugs]  Family History   Problem Relation Age of Onset     Cancer Mother         skin cancer     Rheumatoid Arthritis Mother      Cancer Father         Lymphoma     Crohn's Disease Father      Heart Disease Maternal Grandfather      Heart Disease Paternal Grandmother      Diabetes Paternal Grandmother      Cancer Paternal Grandmother         oral/throat     Hypertension Paternal Grandmother      Breast Cancer No family hx of      Social History     Socioeconomic History     Marital status:      Spouse name: Not on file     Number of children: Not on file     Years of education: Not on file     Highest education level: Not on file   Occupational History     Not on file   Tobacco Use     Smoking status: Former     Smokeless tobacco: Never   Vaping Use     Vaping Use: Never used   Substance and Sexual Activity     Alcohol use:  "Not on file     Drug use: Not on file     Sexual activity: Not on file   Other Topics Concern     Not on file   Social History Narrative     Not on file     Social Determinants of Health     Financial Resource Strain: Not on file   Food Insecurity: Not on file   Transportation Needs: Not on file   Physical Activity: Not on file   Stress: Not on file   Social Connections: Not on file   Intimate Partner Violence: Not on file   Housing Stability: Not on file       Review of Systems  General:  Denies problem  Eyes: Denies problem  Ears/Nose/Throat: Denies problem  Cardiovascular: Denies problem  Respiratory:  Denies problem  Gastrointestinal:  Denies problem   Genitourinary: Denies problem  Musculoskeletal:  Denies problem  Answers for HPI/ROS submitted by the patient on 3/27/2023  abdominal pain: No  Blood in stool: No  Blood in urine: No  chest pain: No  chills: No  congestion: No  constipation: No  cough: No  diarrhea: No  dizziness: No  ear pain: No  eye pain: No  nervous/anxious: No  fever: No  frequency: No  genital sores: No  headaches: No  hearing loss: No  heartburn: No  arthralgias: No  joint swelling: No  peripheral edema: No  mood changes: No  myalgias: No  nausea: No  dysuria: No  palpitations: No  Skin sensation changes: No  sore throat: No  urgency: No  rash: No  shortness of breath: No  visual disturbance: No  weakness: No  pelvic pain: No  vaginal bleeding: No  vaginal discharge: No  tenderness: No  breast mass: No  breast discharge: No         Objective:     BP 98/60   Pulse 60   Temp 98.1  F (36.7  C) (Oral)   Resp 16   Ht 1.588 m (5' 2.5\")   Wt 58.9 kg (129 lb 14.4 oz)   SpO2 98%   BMI 23.38 kg/m     Body mass index is 23.38 kg/m .     Physical Examination: General appearance - alert, well appearing, and in no distress, oriented to person, place, and time and normal appearing weight  Mental status - alert, oriented to person, place, and time, normal mood, behavior, speech, dress, motor " activity, and thought processes  Eyes - pupils equal and reactive, extraocular eye movements intact  Ears - bilateral TM's and external ear canals normal  Nose - normal and patent, no erythema, discharge or polyps  Mouth - mucous membranes moist, pharynx normal without lesions  Neck - supple, no significant adenopathy  Lymphatics - no palpable lymphadenopathy, no hepatosplenomegaly  Chest - clear to auscultation, no wheezes, rales or rhonchi, symmetric air entry  Heart - normal rate, regular rhythm, normal S1, S2, no murmurs, rubs, clicks or gallops  Abdomen - soft, nontender, nondistended, no masses or organomegaly  Breasts -patient declines  Neurological - alert, oriented, normal speech, no focal findings or movement disorder noted  Musculoskeletal - no joint tenderness, deformity or swelling  Extremities - peripheral pulses normal, no pedal edema, no clubbing or cyanosis  Skin - normal coloration and turgor, no rashes, no suspicious skin lesions noted        No visits with results within 14 Day(s) from this visit.   Latest known visit with results is:   Office Visit on 03/21/2022   Component Date Value Ref Range Status     Cholesterol 03/21/2022 202 (H)  <=199 mg/dL Final     Triglycerides 03/21/2022 92  <=149 mg/dL Final     Direct Measure HDL 03/21/2022 79  >=50 mg/dL Final    HDL Cholesterol Reference Range:     0-2 years:   No reference ranges established for patients under 2 years old  [facility] lipid analytes.    2-8 years:  Greater than 45 mg/dL     18 years and older:   Female: Greater than or equal to 50 mg/dL   Male:   Greater than or equal to 40 mg/dL     LDL Cholesterol Calculated 03/21/2022 105  <=129 mg/dL Final     Patient Fasting > 8hrs? 03/21/2022 Yes   Final     Glucose 03/21/2022 91  70 - 125 mg/dL Final     Patient Fasting > 8hrs? 03/21/2022 Yes   Final     TSH 03/21/2022 2.07  0.30 - 5.00 uIU/mL Final     Vitamin D, Total (25-Hydroxy) 03/21/2022 60  30 - 80 ug/L Final      Hemoglobin 03/21/2022 13.2  11.7 - 15.7 g/dL Final

## 2023-03-29 NOTE — PATIENT INSTRUCTIONS
I recommend considering tetanus booster, shingles vaccine, COVID booster, and flu shot.  Check your insurance coverage as these are often covered better at a pharmacy than at a clinic.    I recommend stopping Actonel due to side effects.  Let Dr. Multani know she may have recommendations for next steps.    Preventive Health Recommendations  Female Ages 50 - 64    Yearly exam: See your health care provider every year in order to  Review health changes.   Discuss preventive care.    Review your medicines if your doctor has prescribed any.    Get a Pap test every three years (unless you have an abnormal result and your provider advises testing more often).  If you get Pap tests with HPV test, you only need to test every 5 years, unless you have an abnormal result.   You do not need a Pap test if your uterus was removed (hysterectomy) and you have not had cancer.  You should be tested each year for STDs (sexually transmitted diseases) if you're at risk.   Have a mammogram every 1 to 2 years.  Have a colonoscopy at age 50, or have a yearly FIT test (stool test). These exams screen for colon cancer.    Have a cholesterol test every 5 years, or more often if advised.  Have a diabetes test (fasting glucose) every three years. If you are at risk for diabetes, you should have this test more often.   If you are at risk for osteoporosis (brittle bone disease), think about having a bone density scan (DEXA).    Shots: Get a flu shot each year. Get a tetanus shot every 10 years.    Nutrition:   Eat at least 5 servings of fruits and vegetables each day.  Eat whole-grain bread, whole-wheat pasta and brown rice instead of white grains and rice.  Get adequate Calcium and Vitamin D.     Lifestyle  Exercise at least 150 minutes a week (30 minutes a day, 5 days a week). This will help you control your weight and prevent disease.  Limit alcohol to one drink per day.  No smoking.   Wear sunscreen to prevent skin cancer.   See your  dentist every six months for an exam and cleaning.  See your eye doctor every 1 to 2 years.

## 2023-03-30 LAB — DEPRECATED CALCIDIOL+CALCIFEROL SERPL-MC: 76 UG/L (ref 20–75)

## 2023-05-01 ENCOUNTER — TELEPHONE (OUTPATIENT)
Dept: FAMILY MEDICINE | Facility: CLINIC | Age: 63
End: 2023-05-01
Payer: COMMERCIAL

## 2023-05-02 ENCOUNTER — ALLIED HEALTH/NURSE VISIT (OUTPATIENT)
Dept: FAMILY MEDICINE | Facility: CLINIC | Age: 63
End: 2023-05-02
Payer: COMMERCIAL

## 2023-05-02 DIAGNOSIS — Z23 ENCOUNTER FOR IMMUNIZATION: Primary | ICD-10-CM

## 2023-05-02 PROCEDURE — 99207 PR NO CHARGE NURSE ONLY: CPT

## 2023-05-02 PROCEDURE — 90471 IMMUNIZATION ADMIN: CPT

## 2023-05-02 PROCEDURE — 90715 TDAP VACCINE 7 YRS/> IM: CPT

## 2023-06-06 ENCOUNTER — MYC MEDICAL ADVICE (OUTPATIENT)
Dept: INTERNAL MEDICINE | Facility: CLINIC | Age: 63
End: 2023-06-06
Payer: COMMERCIAL

## 2023-06-06 ENCOUNTER — MYC MEDICAL ADVICE (OUTPATIENT)
Dept: FAMILY MEDICINE | Facility: CLINIC | Age: 63
End: 2023-06-06
Payer: COMMERCIAL

## 2023-06-06 DIAGNOSIS — T75.3XXA MOTION SICKNESS, INITIAL ENCOUNTER: ICD-10-CM

## 2023-06-06 DIAGNOSIS — G43.909 MIGRAINE WITHOUT STATUS MIGRAINOSUS, NOT INTRACTABLE, UNSPECIFIED MIGRAINE TYPE: ICD-10-CM

## 2023-06-06 RX ORDER — SCOLOPAMINE TRANSDERMAL SYSTEM 1 MG/1
1 PATCH, EXTENDED RELEASE TRANSDERMAL
Qty: 3 PATCH | Refills: 0 | Status: SHIPPED | OUTPATIENT
Start: 2023-06-06

## 2023-06-06 RX ORDER — RIZATRIPTAN BENZOATE 10 MG/1
TABLET ORAL
Qty: 12 TABLET | Refills: 3 | Status: SHIPPED | OUTPATIENT
Start: 2023-06-06 | End: 2023-11-02

## 2023-11-02 DIAGNOSIS — G43.909 MIGRAINE WITHOUT STATUS MIGRAINOSUS, NOT INTRACTABLE, UNSPECIFIED MIGRAINE TYPE: ICD-10-CM

## 2023-11-03 RX ORDER — RIZATRIPTAN BENZOATE 10 MG/1
TABLET ORAL
Qty: 12 TABLET | Refills: 3 | Status: SHIPPED | OUTPATIENT
Start: 2023-11-03 | End: 2024-04-02

## 2024-01-10 ENCOUNTER — MYC MEDICAL ADVICE (OUTPATIENT)
Dept: INTERNAL MEDICINE | Facility: CLINIC | Age: 64
End: 2024-01-10
Payer: COMMERCIAL

## 2024-03-01 DIAGNOSIS — Z12.11 COLON CANCER SCREENING: ICD-10-CM

## 2024-03-15 ENCOUNTER — ORDERS ONLY (AUTO-RELEASED) (OUTPATIENT)
Dept: FAMILY MEDICINE | Facility: CLINIC | Age: 64
End: 2024-03-15
Payer: COMMERCIAL

## 2024-03-15 DIAGNOSIS — Z12.11 COLON CANCER SCREENING: ICD-10-CM

## 2024-03-26 ENCOUNTER — TRANSFERRED RECORDS (OUTPATIENT)
Dept: HEALTH INFORMATION MANAGEMENT | Facility: CLINIC | Age: 64
End: 2024-03-26
Payer: COMMERCIAL

## 2024-03-27 LAB — NONINV COLON CA DNA+OCC BLD SCRN STL QL: NEGATIVE

## 2024-03-30 SDOH — HEALTH STABILITY: PHYSICAL HEALTH: ON AVERAGE, HOW MANY MINUTES DO YOU ENGAGE IN EXERCISE AT THIS LEVEL?: 30 MIN

## 2024-03-30 SDOH — HEALTH STABILITY: PHYSICAL HEALTH: ON AVERAGE, HOW MANY DAYS PER WEEK DO YOU ENGAGE IN MODERATE TO STRENUOUS EXERCISE (LIKE A BRISK WALK)?: 5 DAYS

## 2024-03-30 ASSESSMENT — SOCIAL DETERMINANTS OF HEALTH (SDOH): HOW OFTEN DO YOU GET TOGETHER WITH FRIENDS OR RELATIVES?: ONCE A WEEK

## 2024-04-02 ENCOUNTER — OFFICE VISIT (OUTPATIENT)
Dept: FAMILY MEDICINE | Facility: CLINIC | Age: 64
End: 2024-04-02
Attending: FAMILY MEDICINE
Payer: COMMERCIAL

## 2024-04-02 VITALS
TEMPERATURE: 98.2 F | HEART RATE: 60 BPM | RESPIRATION RATE: 16 BRPM | HEIGHT: 63 IN | BODY MASS INDEX: 21.67 KG/M2 | OXYGEN SATURATION: 99 % | SYSTOLIC BLOOD PRESSURE: 100 MMHG | WEIGHT: 122.3 LBS | DIASTOLIC BLOOD PRESSURE: 62 MMHG

## 2024-04-02 DIAGNOSIS — E06.3 HYPOTHYROIDISM DUE TO HASHIMOTO'S THYROIDITIS: ICD-10-CM

## 2024-04-02 DIAGNOSIS — G43.909 MIGRAINE WITHOUT STATUS MIGRAINOSUS, NOT INTRACTABLE, UNSPECIFIED MIGRAINE TYPE: ICD-10-CM

## 2024-04-02 DIAGNOSIS — Z00.00 ROUTINE PHYSICAL EXAMINATION: Primary | ICD-10-CM

## 2024-04-02 DIAGNOSIS — M81.8 IDIOPATHIC OSTEOPOROSIS: ICD-10-CM

## 2024-04-02 DIAGNOSIS — E80.6 DISORDER OF BILIRUBIN EXCRETION: ICD-10-CM

## 2024-04-02 DIAGNOSIS — E04.2 NONTOXIC MULTINODULAR GOITER: ICD-10-CM

## 2024-04-02 DIAGNOSIS — E55.9 VITAMIN D DEFICIENCY: ICD-10-CM

## 2024-04-02 LAB
ANION GAP SERPL CALCULATED.3IONS-SCNC: 11 MMOL/L (ref 7–15)
BUN SERPL-MCNC: 15.3 MG/DL (ref 8–23)
CALCIUM SERPL-MCNC: 9.7 MG/DL (ref 8.8–10.2)
CHLORIDE SERPL-SCNC: 104 MMOL/L (ref 98–107)
CHOLEST SERPL-MCNC: 212 MG/DL
CREAT SERPL-MCNC: 0.86 MG/DL (ref 0.51–0.95)
DEPRECATED HCO3 PLAS-SCNC: 27 MMOL/L (ref 22–29)
EGFRCR SERPLBLD CKD-EPI 2021: 76 ML/MIN/1.73M2
FASTING STATUS PATIENT QL REPORTED: YES
GLUCOSE SERPL-MCNC: 94 MG/DL (ref 70–99)
HDLC SERPL-MCNC: 90 MG/DL
HOLD SPECIMEN: NORMAL
LDLC SERPL CALC-MCNC: 107 MG/DL
NONHDLC SERPL-MCNC: 122 MG/DL
POTASSIUM SERPL-SCNC: 3.7 MMOL/L (ref 3.4–5.3)
SODIUM SERPL-SCNC: 142 MMOL/L (ref 135–145)
TRIGL SERPL-MCNC: 73 MG/DL
TSH SERPL DL<=0.005 MIU/L-ACNC: 2.49 UIU/ML (ref 0.3–4.2)
VIT D+METAB SERPL-MCNC: 60 NG/ML (ref 20–50)

## 2024-04-02 PROCEDURE — 82306 VITAMIN D 25 HYDROXY: CPT | Performed by: FAMILY MEDICINE

## 2024-04-02 PROCEDURE — 99396 PREV VISIT EST AGE 40-64: CPT | Performed by: FAMILY MEDICINE

## 2024-04-02 PROCEDURE — 84443 ASSAY THYROID STIM HORMONE: CPT | Performed by: FAMILY MEDICINE

## 2024-04-02 PROCEDURE — 36415 COLL VENOUS BLD VENIPUNCTURE: CPT | Performed by: FAMILY MEDICINE

## 2024-04-02 PROCEDURE — 80061 LIPID PANEL: CPT | Performed by: FAMILY MEDICINE

## 2024-04-02 PROCEDURE — 80048 BASIC METABOLIC PNL TOTAL CA: CPT | Performed by: FAMILY MEDICINE

## 2024-04-02 RX ORDER — RIZATRIPTAN BENZOATE 10 MG/1
TABLET ORAL
Qty: 12 TABLET | Refills: 3 | Status: SHIPPED | OUTPATIENT
Start: 2024-04-02

## 2024-04-02 RX ORDER — LEVOTHYROXINE SODIUM 75 UG/1
75 TABLET ORAL DAILY
Qty: 90 TABLET | Refills: 4 | Status: SHIPPED | OUTPATIENT
Start: 2024-04-02

## 2024-04-02 ASSESSMENT — ANXIETY QUESTIONNAIRES
3. WORRYING TOO MUCH ABOUT DIFFERENT THINGS: MORE THAN HALF THE DAYS
GAD7 TOTAL SCORE: 9
7. FEELING AFRAID AS IF SOMETHING AWFUL MIGHT HAPPEN: SEVERAL DAYS
6. BECOMING EASILY ANNOYED OR IRRITABLE: SEVERAL DAYS
5. BEING SO RESTLESS THAT IT IS HARD TO SIT STILL: SEVERAL DAYS
GAD7 TOTAL SCORE: 9
GAD7 TOTAL SCORE: 9
4. TROUBLE RELAXING: SEVERAL DAYS
2. NOT BEING ABLE TO STOP OR CONTROL WORRYING: MORE THAN HALF THE DAYS
IF YOU CHECKED OFF ANY PROBLEMS ON THIS QUESTIONNAIRE, HOW DIFFICULT HAVE THESE PROBLEMS MADE IT FOR YOU TO DO YOUR WORK, TAKE CARE OF THINGS AT HOME, OR GET ALONG WITH OTHER PEOPLE: SOMEWHAT DIFFICULT
1. FEELING NERVOUS, ANXIOUS, OR ON EDGE: SEVERAL DAYS
8. IF YOU CHECKED OFF ANY PROBLEMS, HOW DIFFICULT HAVE THESE MADE IT FOR YOU TO DO YOUR WORK, TAKE CARE OF THINGS AT HOME, OR GET ALONG WITH OTHER PEOPLE?: SOMEWHAT DIFFICULT
7. FEELING AFRAID AS IF SOMETHING AWFUL MIGHT HAPPEN: SEVERAL DAYS

## 2024-04-02 ASSESSMENT — PAIN SCALES - GENERAL: PAINLEVEL: NO PAIN (0)

## 2024-04-02 NOTE — PATIENT INSTRUCTIONS
Need to exercise regularly and continue to meet with your therapist.  If you feel like adding a medication like sertraline daily would be helpful, send me a [employer] message.    Schedule a future nurse only visit to begin the shingles vaccine series.  Preventive Care Advice   This is general advice given by our system to help you stay healthy. However, your care team may have specific advice just for you. Please talk to your care team about your preventive care needs.  Nutrition  Eat 5 or more servings of fruits and vegetables each day.  Try wheat bread, brown rice and whole grain pasta (instead of white bread, rice, and pasta).  Get enough calcium and vitamin D. Check the label on foods and aim for 100% of the RDA (recommended daily allowance).  Lifestyle  Exercise at least 150 minutes each week   (30 minutes a day, 5 days a week).  Do muscle strengthening activities 2 days a week. These help control your weight and prevent disease.  No smoking.  Wear sunscreen to prevent skin cancer.  Have a dental exam and cleaning every 6 months.  Yearly exams  See your health care team every year to talk about:  Any changes in your health.  Any medicines your care team has prescribed.  Preventive care, family planning, and ways to prevent chronic diseases.  Shots (vaccines)   HPV shots (up to age 26), if you've never had them before.  Hepatitis B shots (up to age 59), if you've never had them before.  COVID-19 shot: Get this shot when it's due.  Flu shot: Get a flu shot every year.  Tetanus shot: Get a tetanus shot every 10 years.  Pneumococcal, hepatitis A, and RSV shots: Ask your care team if you need these based on your risk.  Shingles shot (for age 50 and up).  General health tests  Diabetes screening:  Starting at age 35, Get screened for diabetes at least every 3 years.  If you are younger than age 35, ask your care team if you should be screened for diabetes.  Cholesterol test: At age 39, start having a cholesterol test  every 5 years, or more often if advised.  Bone density scan (DEXA): At age 50, ask your care team if you should have this scan for osteoporosis (brittle bones).  Hepatitis C: Get tested at least once in your life.  STIs (sexually transmitted infections)  Before age 24: Ask your care team if you should be screened for STIs.  After age 24: Get screened for STIs if you're at risk. You are at risk for STIs (including HIV) if:  You are sexually active with more than one person.  You don't use condoms every time.  You or a partner was diagnosed with a sexually transmitted infection.  If you are at risk for HIV, ask about PrEP medicine to prevent HIV.  Get tested for HIV at least once in your life, whether you are at risk for HIV or not.  Cancer screening tests  Cervical cancer screening: If you have a cervix, begin getting regular cervical cancer screening tests at age 21. Most people who have regular screenings with normal results can stop after age 65. Talk about this with your provider.  Breast cancer scan (mammogram): If you've ever had breasts, begin having regular mammograms starting at age 40. This is a scan to check for breast cancer.  Colon cancer screening: It is important to start screening for colon cancer at age 45.  Have a colonoscopy test every 10 years (or more often if you're at risk) Or, ask your provider about stool tests like a FIT test every year or Cologuard test every 3 years.  To learn more about your testing options, visit: https://www.virocyt/809487.pdf.  For help making a decision, visit: https://bit.ly/ga47232.  Prostate cancer screening test: If you have a prostate and are age 55 to 69, ask your provider if you would benefit from a yearly prostate cancer screening test.  Lung cancer screening: If you are a current or former smoker age 50 to 80, ask your care team if ongoing lung cancer screenings are right for you.  For informational purposes only. Not to replace the advice of your health  care provider. Copyright   2023 [facility]. All rights reserved. Clinically reviewed by the [facility] Transitions Program. [place] 836875 - REV 01/24.    Learning About Stress  What is stress?     Stress is your body's response to a hard situation. Your body can have a physical, emotional, or mental response. Stress is a fact of life for most people, and it affects everyone differently. What causes stress for you may not be stressful for someone else.  A lot of things can cause stress. You may feel stress when you go on a job interview, take a test, or run a race. This kind of short-term stress is normal and even useful. It can help you if you need to work hard or react quickly. For example, stress can help you finish an important job on time.  Long-term stress is caused by ongoing stressful situations or events. Examples of long-term stress include long-term health problems, ongoing problems at work, or conflicts in your family. Long-term stress can harm your health.  How does stress affect your health?  When you are stressed, your body responds as though you are in danger. It makes hormones that speed up your heart, make you breathe faster, and give you a burst of energy. This is called the fight-or-flight stress response. If the stress is over quickly, your body goes back to normal and no harm is done.  But if stress happens too often or lasts too long, it can have bad effects. Long-term stress can make you more likely to get sick, and it can make symptoms of some diseases worse. If you tense up when you are stressed, you may develop neck, shoulder, or low back pain. Stress is linked to high blood pressure and heart disease.  Stress also harms your emotional health. It can make you sesay, tense, or depressed. Your relationships may suffer, and you may not do well at work or school.  What can you do to manage stress?  You can try these things to help manage stress:   Do something active.  Exercise or activity can help reduce stress. Walking is a great way to get started. Even everyday activities such as housecleaning or yard work can help.  Try yoga or david chi. These techniques combine exercise and meditation. You may need some training at first to learn them.  Do something you enjoy. For example, listen to music or go to a movie. Practice your hobby or do volunteer work.  Meditate. This can help you relax, because you are not worrying about what happened before or what may happen in the future.  Do guided imagery. Imagine yourself in any setting that helps you feel calm. You can use online videos, books, or a teacher to guide you.  Do breathing exercises. For example:  From a standing position, bend forward from the waist with your knees slightly bent. Let your arms dangle close to the floor.  Breathe in slowly and deeply as you return to a standing position. Roll up slowly and lift your head last.  Hold your breath for just a few seconds in the standing position.  Breathe out slowly and bend forward from the waist.  Let your feelings out. Talk, laugh, cry, and express anger when you need to. Talking with supportive friends or family, a counselor, or a sugar leader about your feelings is a healthy way to relieve stress. Avoid discussing your feelings with people who make you feel worse.  Write. It may help to write about things that are bothering you. This helps you find out how much stress you feel and what is causing it. When you know this, you can find better ways to cope.  What can you do to prevent stress?  You might try some of these things to help prevent stress:  Manage your time. This helps you find time to do the things you want and need to do.  Get enough sleep. Your body recovers from the stresses of the day while you are sleeping.  Get support. Your family, friends, and community can make a difference in how you experience stress.  Limit your news feed. Avoid or limit time on social  "media or news that may make you feel stressed.  Do something active. Exercise or activity can help reduce stress. Walking is a great way to get started.  Where can you learn more?  Go to https://www.[employer].net/patiented  Enter N032 in the search box to learn more about \"Learning About Stress.\"  Current as of: October 24, 2023               Content Version: 14.0    5005-9680 [employer].   Care instructions adapted under license by your healthcare professional. If you have questions about a medical condition or this instruction, always ask your healthcare professional. [employer] disclaims any warranty or liability for your use of this information.      "

## 2024-04-02 NOTE — PROGRESS NOTES
Preventive Care Visit  [facility]  Katina Alicia MD, Family Medicine  Apr 2, 2024      Assessment & Plan     Routine physical examination  Encouraged healthy lifestyle habits including regular exercise, healthy eating habits, and adequate calcium and vitamin D intake.  Discussed and offered COVID and RSV vaccines which she declines.  She is agreeable to future shingles vaccine series, order placed, declines receiving it today.  Will check fasting lipids and fasting glucose today.  She is up-to-date with Pap smear screening, mammogram screening, colon cancer screening.  - Lipid panel reflex to direct LDL Fasting; Future  - Lipid panel reflex to direct LDL Fasting  - Extra Tube; Future  - Extra Tube    Hypothyroidism due to Hashimoto's thyroiditis  Clinically euthyroid.  Continue levothyroxine.  We will check TSH today.  - levothyroxine (SYNTHROID/LEVOTHROID) 75 MCG tablet; Take 1 tablet (75 mcg) by mouth daily  - TSH WITH FREE T4 REFLEX; Future  - TSH WITH FREE T4 REFLEX  - Extra Tube; Future  - Extra Tube    Nontoxic Multinodular Goiter  Asymptomatic.  - Extra Tube; Future  - Extra Tube    Vitamin D Deficiency  We will check vitamin D level today.  - Vitamin D Deficiency; Future  - Vitamin D Deficiency  - Extra Tube; Future  - Extra Tube    Migraine without status migrainosus, not intractable, unspecified migraine type  Refill provided of rizatriptan.  Will check renal function in monitoring of Excedrin use.  - rizatriptan (MAXALT) 10 MG tablet; TAKE 1 TABLET BY MOUTH AT ONSET OF HEADACHE. MAY REPEAT IN 2 HOURS AS NEEDED. MAXIMUM OF 3 TABLETS PER 24 HOURS  - Basic metabolic panel; Future  - Basic metabolic panel  - Extra Tube; Future  - Extra Tube    Gilbert's Syndrome  This is not clinically significant, other than chronic elevation in bilirubin level which we are not currently monitoring.   - Extra Tube; Future  - Extra Tube    Idiopathic osteoporosis  Will check vitamin D level.  She will  "follow-up with Dr. Multani in September after her follow-up bone density scan.  - Extra Tube; Future  - Extra Tube    Mild depression and anxiety symptoms  Does not meet diagnostic criteria for either condition but is having distress related to this, working with therapist.  Discussed consideration of sertraline if desired, she will consider and will send me a MyChart message if desiring to initiate.              Counseling  Appropriate preventive services were discussed with this patient, including applicable screening as appropriate for fall prevention, nutrition, physical activity, Tobacco-use cessation, weight loss and cognition.  Checklist reviewing preventive services available has been given to the patient.  Reviewed patient's diet, addressing concerns and/or questions.           Radha Gamboa is a 63 year old, presenting for the following:  Physical (fasting)             Seen today for routine preventive care visit.  She is fasting today.  Followed by Dr. Multani and management of osteoporosis, did not tolerate Fosamax or Actonel, currently not taking anything.  Planning to have her bone density follow-up scan in September and will follow-up with Dr. Multani then.  Remains on levothyroxine and management of hypothyroidism, also known nontoxic multinodular goiter that is asymptomatic.  History of vitamin D deficiency is due for follow-up.  History of migraines, weather is a common trigger for her.  Using Excedrin primarily and occasionally rizatriptan with good effect.  Working with Dr. Jeanine Stephenson of dermatology due to history of basal cell carcinoma and squamous cell carcinoma.    Her  has been diagnosed with \"high functioning alcohol addiction\" patient has been having some difficulty with depression and anxiety symptoms as a result of that.  Reports reduced motivation, less regular activity, increase in [place].  PHQ-9 today with total score of 7, HAIM-7 with total score of 9.  Some sleep " disruption though this is not uncommon for her.  She is working with a therapist but also thinking about whether medication may be helpful for her as well.  Remains physically active.  Overall eating healthy with focus on protein.          3/30/2024   General Health   How would you rate your overall physical health? Excellent   Feel stress (tense, anxious, or unable to sleep) Very much   (!) STRESS CONCERN      3/30/2024   Nutrition   Three or more servings of calcium each day? Yes   Diet: Vegetarian/vegan   How many servings of fruit and vegetables per day? (!) 2-3   How many sweetened beverages each day? 0-1         3/30/2024   Exercise   Days per week of moderate/strenous exercise 5 days   Average minutes spent exercising at this level 30 min         3/30/2024   Social Factors   Frequency of gathering with friends or relatives Once a week   Worry food won't last until get money to buy more No   Food not last or not have enough money for food? No   Do you have housing?  Yes   Are you worried about losing your housing? No   Lack of transportation? No   Unable to get utilities (heat,electricity)? No         3/30/2024   Fall Risk   Fallen 2 or more times in the past year? No   Trouble with walking or balance? No          3/30/2024   Dental   Dentist two times every year? Yes         3/30/2024   TB Screening   Were you born outside of the [place]? No         Today's PHQ-2 Score:       4/2/2024     9:51 AM   PHQ-2 ( 1999 Pfizer)   Q1: Little interest or pleasure in doing things 0   Q2: Feeling down, depressed or hopeless 0   PHQ-2 Score 0   Q1: Little interest or pleasure in doing things Not at all   Q2: Feeling down, depressed or hopeless Not at all   PHQ-2 Score 0           3/30/2024   Substance Use   Alcohol more than 3/day or more than 7/wk No   Do you use any other substances recreationally? No     Social History     Tobacco Use    Smoking status: Former    Smokeless tobacco: Never   Vaping Use    Vaping Use: Never  used   Substance Use Topics    Alcohol use: Yes     Comment: Social    Drug use: Yes     Types: Marijuana           3/27/2023   LAST FHS-7 RESULTS   1st degree relative breast or ovarian cancer No   Any relative bilateral breast cancer Yes   Any male have breast cancer No   Any ONE woman have BOTH breast AND ovarian cancer Yes   Any woman with breast cancer before 50yrs No   2 or more relatives with breast AND/OR ovarian cancer No   2 or more relatives with breast AND/OR bowel cancer No        Mammogram Screening - Mammogram every 1-2 years updated in Health Maintenance based on mutual decision making        3/30/2024   STI Screening   New sexual partner(s) since last STI/HIV test? No     History of abnormal Pap smear: NO - age 30-65 PAP every 5 years with negative HPV co-testing recommended       ASCVD Risk   The 10-year ASCVD risk score (Kitty WISEMAN, et al., 2019) is: 2.2%    Values used to calculate the score:      Age: 63 years      Sex: Female      Is Non- : No      Diabetic: No      Tobacco smoker: No      Systolic Blood Pressure: 100 mmHg      Is BP treated: No      HDL Cholesterol: 80 mg/dL      Total Cholesterol: 223 mg/dL    Fracture Risk Assessment Tool  Link to Frax Calculator  Use the information below to complete the Frax calculator  : 1960  Sex: female  Weight (kg): 55.5 kg (actual weight)  Height (cm): 158.8 cm  Previous Fragility Fracture:  No  History of parent with fractured hip:  No  Current Smoking:  No  Patient has been on glucocorticoids for more than 3 months (5mg/day or more): No  Rheumatoid Arthritis on Problem List:  No  Secondary Osteoporosis on Problem List:  No  Consumes 3 or more units of alcohol per day: No  Femoral Neck BMD (g/cm2)           Reviewed and updated as needed this visit by Provider                    Past Medical History:   Diagnosis Date    Arthritis     Cancer (H)     Skin cancers    Thyroid disease      No past surgical  history on file.  OB History    Para Term  AB Living   3 3 3 0 0 0   SAB IAB Ectopic Multiple Live Births   0 0 0 0 0      # Outcome Date GA Lbr Tj/2nd Weight Sex Delivery Anes PTL Lv   3 Term            2 Term            1 Term              Lab work is in process  Labs reviewed in EPIC  BP Readings from Last 3 Encounters:   24 100/62   23 98/60   22 98/56    Wt Readings from Last 3 Encounters:   24 55.5 kg (122 lb 4.8 oz)   23 58.9 kg (129 lb 14.4 oz)   22 55.5 kg (122 lb 4.8 oz)                  Patient Active Problem List   Diagnosis    Nontoxic Multinodular Goiter    Hypothyroidism due to Hashimoto's thyroiditis    Vitamin D Deficiency    Gilbert's Syndrome    History of basal cell carcinoma    Migraines    Idiopathic osteoporosis     No past surgical history on file.    Social History     Tobacco Use    Smoking status: Former    Smokeless tobacco: Never   Substance Use Topics    Alcohol use: Yes     Comment: Social     Family History   Problem Relation Age of Onset    Cancer Mother         skin cancer    Rheumatoid Arthritis Mother     Osteoporosis Mother     Cancer Father         Lymphoma    Crohn's Disease Father     Other Cancer Father         Lymphoma    Substance Abuse Father     Heart Disease Maternal Grandfather     Diabetes Maternal Grandfather     Heart Disease Paternal Grandmother     Diabetes Paternal Grandmother     Cancer Paternal Grandmother         oral/throat    Hypertension Paternal Grandmother     Breast Cancer Cousin          Current Outpatient Medications   Medication Sig Dispense Refill    Ascorbic Acid (VITAMIN C) 500 MG CAPS Take 1 tablet by mouth daily      aspirin-acetaminophen-caffeine (EXCEDRIN MIGRAINE) 250-250-65 mg per tablet [ASPIRIN-ACETAMINOPHEN-CAFFEINE (EXCEDRIN MIGRAINE) 250-250-65 MG PER TABLET] Take 1 tablet by mouth every 6 (six) hours as needed for pain.      calcium carbonate (OS-CRAIG) 600 mg (1,500 mg) tablet [CALCIUM  CARBONATE (OS-CRAIG) 600 MG (1,500 MG) TABLET] Take 600 mg by mouth daily. Plus D 400mg      cholecalciferol, vitamin D3, (VITAMIN D3) 2,000 unit cap Take 4,000 Units by mouth daily      glucosamine-chondroitin 500-400 mg tablet [GLUCOSAMINE-CHONDROITIN 500-400 MG TABLET] Take 1 tablet by mouth 3 (three) times a day.      levothyroxine (SYNTHROID/LEVOTHROID) 75 MCG tablet Take 1 tablet (75 mcg) by mouth daily 90 tablet 4    magnesium 30 mg tablet [MAGNESIUM 30 MG TABLET] Take 250 mg by mouth daily.      naproxen (NAPROSYN) 500 MG tablet [NAPROXEN (NAPROSYN) 500 MG TABLET] Take 500 mg by mouth 2 (two) times a day with meals.      OMEGA-3/DHA/EPA/FISH OIL (FISH OIL-OMEGA-3 FATTY ACIDS) 300-1,000 mg capsule [OMEGA-3/DHA/EPA/FISH OIL (FISH OIL-OMEGA-3 FATTY ACIDS) 300-1,000 MG CAPSULE] Take 1,200 mg by mouth daily. Takes when needed      rizatriptan (MAXALT) 10 MG tablet TAKE 1 TABLET BY MOUTH AT ONSET OF HEADACHE. MAY REPEAT IN 2 HOURS AS NEEDED. MAXIMUM OF 3 TABLETS PER 24 HOURS 12 tablet 3    scopolamine (TRANSDERM) 1 MG/3DAYS 72 hr patch Place 1 patch onto the skin every 72 hours 3 patch 0     Allergies   Allergen Reactions    Penicillins Unknown    Sulfa (Sulfonamide Antibiotics) [Sulfa Antibiotics] Rash     Recent Labs   Lab Test 03/29/23  1243 03/21/22  1015 02/17/21  0900 02/05/20  1006 09/10/19  1637   * 105 109   < >  --    HDL 80 79 83   < >  --    TRIG 73 92 83   < >  --    ALT 11  --   --   --  <9   CR 0.86  --  0.79  --  0.84   GFRESTIMATED 76  --  >60  --  >60   GFRESTBLACK  --   --  >60  --  >60   POTASSIUM 3.8  --  4.5  --  3.8   TSH 2.26 2.07 2.69   < > 1.28    < > = values in this interval not displayed.          Review of Systems  Constitutional, neuro, ENT, endocrine, pulmonary, cardiac, gastrointestinal, genitourinary, musculoskeletal, integument and psychiatric systems are negative, except as otherwise noted.     Objective    Exam  /62   Pulse 60   Temp 98.2  F (36.8  C) (Temporal)    "Resp 16   Ht 1.588 m (5' 2.5\")   Wt 55.5 kg (122 lb 4.8 oz)   SpO2 99%   BMI 22.01 kg/m     Estimated body mass index is 22.01 kg/m  as calculated from the following:    Height as of this encounter: 1.588 m (5' 2.5\").    Weight as of this encounter: 55.5 kg (122 lb 4.8 oz).    Physical Exam  Physical Examination: General appearance - alert, well appearing, and in no distress, oriented to person, place, and time and normal appearing weight  Mental status - alert, oriented to person, place, and time, normal mood, behavior, speech, dress, motor activity, and thought processes  Eyes - pupils equal and reactive, extraocular eye movements intact  Ears - bilateral TM's and external ear canals normal  Nose - normal and patent, no erythema, discharge or polyps  Mouth - mucous membranes moist, pharynx normal without lesions  Neck - supple, no significant adenopathy  Lymphatics - no palpable lymphadenopathy, no hepatosplenomegaly  Chest - clear to auscultation, no wheezes, rales or rhonchi, symmetric air entry  Heart - normal rate, regular rhythm, normal S1, S2, no murmurs, rubs, clicks or gallops  Abdomen - soft, nontender, nondistended, no masses or organomegaly  Breasts - breasts appear normal, no suspicious masses, no skin or nipple changes or axillary nodes  Neurological - alert, oriented, normal speech, no focal findings or movement disorder noted  Musculoskeletal - no joint tenderness, deformity or swelling  Extremities - peripheral pulses normal, no pedal edema, no clubbing or cyanosis  Skin - normal coloration and turgor, no rashes, no suspicious skin lesions noted          Signed Electronically by: Katina Alicia MD    Answers submitted by the patient for this visit:  HAIM-7 (Submitted on 4/2/2024)  HAIM 7 TOTAL SCORE: 9    "

## 2024-05-14 ENCOUNTER — MYC MEDICAL ADVICE (OUTPATIENT)
Dept: FAMILY MEDICINE | Facility: CLINIC | Age: 64
End: 2024-05-14
Payer: COMMERCIAL

## 2024-05-22 NOTE — TELEPHONE ENCOUNTER
Nora calling back, states she faxed back to us in regards to needing a signed order. She faxed this on 5/15/24 and again yesterday.    She needs this filled out or one of our actual orders and must be signed.    Please call [phone] and fax [phone]

## 2024-10-16 ENCOUNTER — MYC MEDICAL ADVICE (OUTPATIENT)
Dept: INTERNAL MEDICINE | Facility: CLINIC | Age: 64
End: 2024-10-16
Payer: COMMERCIAL

## 2024-10-16 DIAGNOSIS — M81.8 IDIOPATHIC OSTEOPOROSIS: Primary | ICD-10-CM

## 2024-10-16 NOTE — TELEPHONE ENCOUNTER
Order/Referral Request    Who is requesting: Patient    Orders being requested: Dexa scan    Reason service is needed/diagnosis: M85.88 (ICD-10-CM) - Osteopenia of spine     When are orders needed by: as soon as possible.    Has this been discussed with Provider: Yes    Does patient have a preference on a Group/Provider/Facility? [facility]    Does patient have an appointment scheduled?: No    Where to send orders: Place orders within Epic    Could we send this information to you in [employer] or would you prefer to receive a phone call?:   Patient would like to be contacted via [employer]    Misa Jones

## 2024-10-16 NOTE — TELEPHONE ENCOUNTER
Last Dexa 8/31/2022.     Pended dexa order for Dr. Multani to review and sign.     Mattie RUIZ RN

## 2024-11-04 ENCOUNTER — HOSPITAL ENCOUNTER (OUTPATIENT)
Dept: BONE DENSITY | Facility: HOSPITAL | Age: 64
Discharge: HOME | End: 2024-11-04
Attending: INTERNAL MEDICINE | Admitting: INTERNAL MEDICINE
Payer: COMMERCIAL

## 2024-11-04 DIAGNOSIS — M81.8 IDIOPATHIC OSTEOPOROSIS: ICD-10-CM

## 2024-11-04 PROCEDURE — 77080 DXA BONE DENSITY AXIAL: CPT

## 2024-11-06 ENCOUNTER — VIRTUAL VISIT (OUTPATIENT)
Dept: INTERNAL MEDICINE | Facility: CLINIC | Age: 64
End: 2024-11-06
Payer: COMMERCIAL

## 2024-11-06 DIAGNOSIS — E06.3 HYPOTHYROIDISM DUE TO HASHIMOTO'S THYROIDITIS: ICD-10-CM

## 2024-11-06 DIAGNOSIS — M81.8 IDIOPATHIC OSTEOPOROSIS: Primary | ICD-10-CM

## 2024-11-06 PROCEDURE — 99442 PR PHYSICIAN TELEPHONE EVALUATION 11-20 MIN: CPT | Mod: 93 | Performed by: INTERNAL MEDICINE

## 2024-11-06 NOTE — PROGRESS NOTES
Bee is a 64 year old who is being evaluated via a billable telephone visit.    What phone number would you like to be contacted at? [phone]   How would you like to obtain your AVS? Teo  Originating Location (pt. Location): Home    Distant Location (provider location):  On-site    Assessment & Plan     Idiopathic osteoporosis  63 yo female, follow-up osteoporosis. No history of fragility fractures.  She tried Fosamax and Actonel in the last few years, both just for 1-2 months and had significant muscle and bone pain, as also chest tightness and heartburn.  She had DXA scan done on 11/4/24 and we discussed it today.  -Lumbar Spine: L1-L4: BMD: 0.892 g/cm2. T-score: -2.4. Z-score: -1.0.   -RIGHT Hip Total: BMD: 0.860 g/cm2. T-score: -1.2. Z-score: -0.1.  -RIGHT Hip Femoral neck: BMD: 0.825 g/cm2. T-score: -1.5. Z-score: -0.2.  -LEFT Hip Total: BMD: 0.902 g/cm2. T-score: -0.8. Z-score: 0.3.  -LEFT Hip Femoral neck: BMD: 0.829 g/cm2. T-score: -1.5. Z-score: -0.1.  INTERVAL CHANGE  -There has been a 1.4% increase in lumbar spine BMD.  -There has been a 2.1% increase in bilateral hip BMD.     Based on this result of osteopenia with moderate fracture risk, we discussed holding off medication treatment. She will continue with good calcium and vit D intake and weight bearing exercise. We will repeat DXA in 1 year.    I reviewed the labs:  Component      Latest Ref Rng 4/2/2024  10:42 AM   Sodium      135 - 145 mmol/L 142    Potassium      3.4 - 5.3 mmol/L 3.7    Chloride      98 - 107 mmol/L 104    Carbon Dioxide (CO2)      22 - 29 mmol/L 27    Anion Gap      7 - 15 mmol/L 11    Urea Nitrogen      8.0 - 23.0 mg/dL 15.3    Creatinine      0.51 - 0.95 mg/dL 0.86    GFR Estimate      >60 mL/min/1.73m2 76    Calcium      8.8 - 10.2 mg/dL 9.7    Glucose      70 - 99 mg/dL 94    Vitamin D, Total (25-Hydroxy)      20 - 50 ng/mL 60 (H)        Hypothyroidism due to Hashimoto's thyroiditis  Component      Latest Ref Rng  4/2/2024  10:42 AM   TSH      0.30 - 4.20 uIU/mL 2.49      Stable on levothyroxine.  Excessive thyroid hormone causes osteoporosis and fractures. This can occur when hyperthyroid disease goes untreated or patient is taking too much levothyroxine. One study of women with osteoporosis concluded that taking a high dose of thyroid hormone (>150 micrograms/day) increased their fracture risk more than a 50%.             Radha Gamboa is a 64 year old, presenting for the following health issues:  DXA F/U (DXA F/U)    HPI                 Objective           Vitals:  No vitals were obtained today due to virtual visit.    Physical Exam   General: Alert and no distress //Respiratory: No audible wheeze, cough, or shortness of breath // Psychiatric:  Appropriate affect, tone, and pace of words            Phone call duration: 12 minutes  Signed Electronically by: Loc Multani MD

## 2024-11-18 ENCOUNTER — OFFICE VISIT (OUTPATIENT)
Dept: FAMILY MEDICINE | Facility: CLINIC | Age: 64
End: 2024-11-18
Payer: COMMERCIAL

## 2024-11-18 VITALS
OXYGEN SATURATION: 98 % | DIASTOLIC BLOOD PRESSURE: 63 MMHG | BODY MASS INDEX: 23.22 KG/M2 | WEIGHT: 129 LBS | HEART RATE: 61 BPM | SYSTOLIC BLOOD PRESSURE: 119 MMHG | RESPIRATION RATE: 20 BRPM | TEMPERATURE: 97.3 F

## 2024-11-18 DIAGNOSIS — R59.9 LYMPH NODE ENLARGEMENT: Primary | ICD-10-CM

## 2024-11-18 PROCEDURE — 99213 OFFICE O/P EST LOW 20 MIN: CPT

## 2024-11-18 NOTE — PROGRESS NOTES
Assessment & Plan     Lymph node enlargement  No noted lymph node enlargement on exam.  No concerning red flag symptoms.  Possible lymph node swelling could of been related to previous URI symptoms.      Radha Gamboa is a 64 year old, presenting for the following health issues:  Neck Problem (Went to the dentist two weeks ago and they noticed a lump on the neck. Kept appt. Double check if everything is okay. )      11/18/2024    12:19 PM   Additional Questions   Roomed by Kayla Smith   Accompanied by self     HPI     Patient is a 64-year-old female presenting for possible lymph node enlargement.  Had dentist appointment two weeks ago and dentist noted one lymph node enlargement to underside of left chin.  Patient was asymptomatic at that time.  One week after dentist appointment, patient developed cold with symptoms of runny nose and sore throat.  Patient has not been able to palpate lymph node enlargement.  Denies throat swelling, difficulty swallowing, redness, neck pain, fever, chills, gum swelling.  URI symptoms have resolved, patient states she feels well.    Review of Systems  Review of systems negative otherwise known HPI.    Past Medical History:   Diagnosis Date    Arthritis     Cancer (H) 2003    Skin cancers    Thyroid disease 1996       No past surgical history on file.    Family History   Problem Relation Age of Onset    Cancer Mother         skin cancer    Rheumatoid Arthritis Mother     Osteoporosis Mother     Cancer Father         Lymphoma    Crohn's Disease Father     Other Cancer Father         Lymphoma    Substance Abuse Father     Heart Disease Maternal Grandfather     Diabetes Maternal Grandfather     Heart Disease Paternal Grandmother     Diabetes Paternal Grandmother     Cancer Paternal Grandmother         oral/throat    Hypertension Paternal Grandmother     Breast Cancer Cousin        Social History     Tobacco Use    Smoking status: Former    Smokeless tobacco: Never   Substance Use  Topics    Alcohol use: Yes     Comment: Social     Current Outpatient Medications   Medication Sig Dispense Refill    Ascorbic Acid (VITAMIN C) 500 MG CAPS Take 1 tablet by mouth daily.      aspirin-acetaminophen-caffeine (EXCEDRIN MIGRAINE) 250-250-65 mg per tablet Take 1 tablet by mouth every 6 hours as needed.      calcium carbonate (OS-CRAIG) 600 mg (1,500 mg) tablet Take 600 mg by mouth daily.      cholecalciferol, vitamin D3, (VITAMIN D3) 2,000 unit cap Take 4,000 Units by mouth daily.      glucosamine-chondroitin 500-400 mg tablet Take 1 tablet by mouth 3 times daily.      levothyroxine (SYNTHROID/LEVOTHROID) 75 MCG tablet Take 1 tablet (75 mcg) by mouth daily 90 tablet 4    magnesium 30 mg tablet Take 250 mg by mouth daily.      naproxen (NAPROSYN) 500 MG tablet Take 500 mg by mouth 2 times daily (with meals).      OMEGA-3/DHA/EPA/FISH OIL (FISH OIL-OMEGA-3 FATTY ACIDS) 300-1,000 mg capsule Take 1,200 mg by mouth daily.      rizatriptan (MAXALT) 10 MG tablet TAKE 1 TABLET BY MOUTH AT ONSET OF HEADACHE. MAY REPEAT IN 2 HOURS AS NEEDED. MAXIMUM OF 3 TABLETS PER 24 HOURS 12 tablet 3    scopolamine (TRANSDERM) 1 MG/3DAYS 72 hr patch Place 1 patch onto the skin every 72 hours 3 patch 0     No current facility-administered medications for this visit.       Objective    /63 (BP Location: Right arm, Patient Position: Sitting, Cuff Size: Adult Regular)   Pulse 61   Temp 97.3  F (36.3  C)   Resp 20   Wt 58.5 kg (129 lb)   SpO2 98%   BMI 23.22 kg/m    Body mass index is 23.22 kg/m .  Physical Exam   General: Alert, oriented, no acute distress.    Head: Normocephalic and atraumatic.   Eyes: Conjunctiva and sclera clear. TO.   Ears: External ear nontender. TMs intact and clear. Grossly normal hearing.   Oropharynx: Dentition intact. Oral and posterior pharynx pink and moist.     Neck: Supple, no masses or nodes. No adenopathy.    Respiratory: Lungs clear, unlabored. No rales, rhonchi, or wheezes.     Cardiovascular: Regular rate and rhythm, S1 and S2. No murmurs.   Neurologic: Mentation intact and speech normal.     Psychiatric: Appropriate affect.     Signed Electronically by: DANETTE Moreno CNP

## 2025-01-23 ENCOUNTER — MYC MEDICAL ADVICE (OUTPATIENT)
Dept: FAMILY MEDICINE | Facility: CLINIC | Age: 65
End: 2025-01-23
Payer: COMMERCIAL

## 2025-01-23 DIAGNOSIS — T75.3XXA MOTION SICKNESS, INITIAL ENCOUNTER: ICD-10-CM

## 2025-01-23 RX ORDER — SCOPOLAMINE 1 MG/3D
1 PATCH, EXTENDED RELEASE TRANSDERMAL
Qty: 4 PATCH | Refills: 0 | Status: SHIPPED | OUTPATIENT
Start: 2025-01-23

## 2025-01-23 NOTE — TELEPHONE ENCOUNTER
Patient requesting refill of scopalamine patch for upcoming vacation. Medication pended for provider review to requested pharmacy.    Routing to provider to review and refill as appropriate.    Kath Chin RN  [facility]

## 2025-03-10 ENCOUNTER — OFFICE VISIT (OUTPATIENT)
Dept: FAMILY MEDICINE | Facility: CLINIC | Age: 65
End: 2025-03-10
Payer: COMMERCIAL

## 2025-03-10 VITALS
HEIGHT: 63 IN | BODY MASS INDEX: 22.32 KG/M2 | DIASTOLIC BLOOD PRESSURE: 63 MMHG | OXYGEN SATURATION: 98 % | TEMPERATURE: 98.2 F | WEIGHT: 126 LBS | SYSTOLIC BLOOD PRESSURE: 106 MMHG | HEART RATE: 68 BPM | RESPIRATION RATE: 14 BRPM

## 2025-03-10 DIAGNOSIS — H65.03 NON-RECURRENT ACUTE SEROUS OTITIS MEDIA OF BOTH EARS: Primary | ICD-10-CM

## 2025-03-10 PROCEDURE — 3078F DIAST BP <80 MM HG: CPT | Performed by: FAMILY MEDICINE

## 2025-03-10 PROCEDURE — 99213 OFFICE O/P EST LOW 20 MIN: CPT | Performed by: FAMILY MEDICINE

## 2025-03-10 PROCEDURE — 3074F SYST BP LT 130 MM HG: CPT | Performed by: FAMILY MEDICINE

## 2025-03-10 ASSESSMENT — ENCOUNTER SYMPTOMS
COUGH: 1
HEADACHES: 1
FATIGUE: 1

## 2025-03-10 NOTE — PROGRESS NOTES
Assessment & Plan     (H65.03) Non-recurrent acute serous otitis media of both ears  (primary encounter diagnosis)  Comment: Bilateral acute serous otitis media  Plan: Reassurance, recommend symptomatic treatment with nasal steroid medications antihistamines possibly Sudafed give this time and follow-up as needed                Radha Gamboa is a 64 year old, presenting for the following health issues:  Cough (Started 03/01/2025), Sinus Problem, Fatigue, Headache, Ear Problem, and Derm Problem      3/10/2025     2:06 PM   Additional Questions   Roomed by Angel     Cough  Associated symptoms include ear pain and headaches.   Sinus Problem   Associated symptoms include ear pain and cough.   Fatigue  Associated symptoms include coughing, fatigue and headaches.   Headache     Ear Problem  Associated symptoms include headaches and cough.   History of Present Illness       Reason for visit:  Ear pain and plugged and rash  Symptom onset:  1-2 weeks ago  Symptoms include:  Ears plugged, headache, cough, stuffy nose, rash on torso  Symptom intensity:  Moderate  Symptom progression:  Improving  Had these symptoms before:  No  What makes it worse:  Everything getting better except ears.  What makes it better:  OTC drugs helped a little when symptoms were bad   She is taking medications regularly.      Patient comes in because she is not feeling well, she has been under the weather for about a week and a half or so, she also is just getting back from some overseas travel.  The patient reports that she feels there is a lot of pressure behind her ears, not hearing as well congested fatigue headache maybe a low-grade fever.    Patient does not have one predominant symptom that what bothers her most is the ear symptom.    On examination the patient has evidence of fluid behind the ears, and I told her that I do think that what she has is probably from a viral infection that is causing the symptoms, I do not think testing  "would be worthwhile since we would not treat anyway    Of note patient also has a rash on her back and torso, and this would lean towards a viral illness.                        Objective    /63   Pulse 68   Temp 98.2  F (36.8  C) (Oral)   Resp 14   Ht 1.588 m (5' 2.5\")   Wt 57.2 kg (126 lb)   SpO2 98%   BMI 22.68 kg/m    Body mass index is 22.68 kg/m .  Physical Exam   ENT examination tympanic membranes are both dull bilaterally  Lungs clear  Skin the patient has diffuse rash noted on the back              Signed Electronically by: Anderson Zelaya MD    "

## 2025-03-30 SDOH — HEALTH STABILITY: PHYSICAL HEALTH: ON AVERAGE, HOW MANY DAYS PER WEEK DO YOU ENGAGE IN MODERATE TO STRENUOUS EXERCISE (LIKE A BRISK WALK)?: 5 DAYS

## 2025-03-30 SDOH — HEALTH STABILITY: PHYSICAL HEALTH: ON AVERAGE, HOW MANY MINUTES DO YOU ENGAGE IN EXERCISE AT THIS LEVEL?: 30 MIN

## 2025-03-30 ASSESSMENT — SOCIAL DETERMINANTS OF HEALTH (SDOH): HOW OFTEN DO YOU GET TOGETHER WITH FRIENDS OR RELATIVES?: TWICE A WEEK

## 2025-04-02 ENCOUNTER — OFFICE VISIT (OUTPATIENT)
Dept: FAMILY MEDICINE | Facility: CLINIC | Age: 65
End: 2025-04-02
Attending: FAMILY MEDICINE
Payer: COMMERCIAL

## 2025-04-02 VITALS
HEIGHT: 63 IN | DIASTOLIC BLOOD PRESSURE: 60 MMHG | TEMPERATURE: 98 F | OXYGEN SATURATION: 99 % | HEART RATE: 66 BPM | BODY MASS INDEX: 22.86 KG/M2 | WEIGHT: 129 LBS | SYSTOLIC BLOOD PRESSURE: 110 MMHG | RESPIRATION RATE: 18 BRPM

## 2025-04-02 DIAGNOSIS — H69.91 DYSFUNCTION OF RIGHT EUSTACHIAN TUBE: ICD-10-CM

## 2025-04-02 DIAGNOSIS — G43.909 MIGRAINE WITHOUT STATUS MIGRAINOSUS, NOT INTRACTABLE, UNSPECIFIED MIGRAINE TYPE: ICD-10-CM

## 2025-04-02 DIAGNOSIS — M81.8 IDIOPATHIC OSTEOPOROSIS: ICD-10-CM

## 2025-04-02 DIAGNOSIS — E55.9 VITAMIN D DEFICIENCY: ICD-10-CM

## 2025-04-02 DIAGNOSIS — E04.2 NONTOXIC MULTINODULAR GOITER: ICD-10-CM

## 2025-04-02 DIAGNOSIS — H65.491 CHRONIC OTITIS MEDIA OF RIGHT EAR WITH EFFUSION: ICD-10-CM

## 2025-04-02 DIAGNOSIS — Z85.828 HISTORY OF BASAL CELL CARCINOMA: ICD-10-CM

## 2025-04-02 DIAGNOSIS — Z00.00 ROUTINE PHYSICAL EXAMINATION: Primary | ICD-10-CM

## 2025-04-02 DIAGNOSIS — E06.3 HYPOTHYROIDISM DUE TO HASHIMOTO'S THYROIDITIS: ICD-10-CM

## 2025-04-02 LAB
CHOLEST SERPL-MCNC: 214 MG/DL
FASTING STATUS PATIENT QL REPORTED: ABNORMAL
FASTING STATUS PATIENT QL REPORTED: ABNORMAL
GLUCOSE SERPL-MCNC: 100 MG/DL (ref 70–99)
HDLC SERPL-MCNC: 85 MG/DL
LDLC SERPL CALC-MCNC: 116 MG/DL
NONHDLC SERPL-MCNC: 129 MG/DL
TRIGL SERPL-MCNC: 64 MG/DL
TSH SERPL DL<=0.005 MIU/L-ACNC: 1.55 UIU/ML (ref 0.3–4.2)
VIT D+METAB SERPL-MCNC: 54 NG/ML (ref 20–50)

## 2025-04-02 PROCEDURE — 3074F SYST BP LT 130 MM HG: CPT | Performed by: FAMILY MEDICINE

## 2025-04-02 PROCEDURE — 84443 ASSAY THYROID STIM HORMONE: CPT | Performed by: FAMILY MEDICINE

## 2025-04-02 PROCEDURE — 99213 OFFICE O/P EST LOW 20 MIN: CPT | Mod: 25 | Performed by: FAMILY MEDICINE

## 2025-04-02 PROCEDURE — 80061 LIPID PANEL: CPT | Performed by: FAMILY MEDICINE

## 2025-04-02 PROCEDURE — 36415 COLL VENOUS BLD VENIPUNCTURE: CPT | Performed by: FAMILY MEDICINE

## 2025-04-02 PROCEDURE — 3078F DIAST BP <80 MM HG: CPT | Performed by: FAMILY MEDICINE

## 2025-04-02 PROCEDURE — 82947 ASSAY GLUCOSE BLOOD QUANT: CPT | Performed by: FAMILY MEDICINE

## 2025-04-02 PROCEDURE — G2211 COMPLEX E/M VISIT ADD ON: HCPCS | Performed by: FAMILY MEDICINE

## 2025-04-02 PROCEDURE — 99396 PREV VISIT EST AGE 40-64: CPT | Performed by: FAMILY MEDICINE

## 2025-04-02 PROCEDURE — 82306 VITAMIN D 25 HYDROXY: CPT | Performed by: FAMILY MEDICINE

## 2025-04-02 PROCEDURE — 1126F AMNT PAIN NOTED NONE PRSNT: CPT | Performed by: FAMILY MEDICINE

## 2025-04-02 RX ORDER — RIZATRIPTAN BENZOATE 10 MG/1
TABLET ORAL
Qty: 12 TABLET | Refills: 4 | Status: SHIPPED | OUTPATIENT
Start: 2025-04-02

## 2025-04-02 RX ORDER — LEVOTHYROXINE SODIUM 75 UG/1
75 TABLET ORAL DAILY
Qty: 90 TABLET | Refills: 4 | Status: SHIPPED | OUTPATIENT
Start: 2025-04-02

## 2025-04-02 ASSESSMENT — PAIN SCALES - GENERAL: PAINLEVEL_OUTOF10: NO PAIN (0)

## 2025-04-02 NOTE — PATIENT INSTRUCTIONS
I recommend continuing fluticasone (Flonase) nasal spray until your ear symptoms have resolved.  You can try taking Afrin (oxymetazoline) nasal spray just prior to your flight.  Do not take oxymetazoline or Afrin nasal spray more than 2 days in a row or you can develop severe rebound congestion.    Consider receiving the pneumonia vaccine, COVID booster, and shingles vaccine series.    Patient Education   Preventive Care Advice   This is general advice given by our system to help you stay healthy. However, your care team may have specific advice just for you. Please talk to your care team about your preventive care needs.  Nutrition  Eat 5 or more servings of fruits and vegetables each day.  Try wheat bread, brown rice and whole grain pasta (instead of white bread, rice, and pasta).  Get enough calcium and vitamin D. Check the label on foods and aim for 100% of the RDA (recommended daily allowance).  Lifestyle  Exercise at least 150 minutes each week  (30 minutes a day, 5 days a week).  Do muscle strengthening activities 2 days a week. These help control your weight and prevent disease.  No smoking.  Wear sunscreen to prevent skin cancer.  Have a dental exam and cleaning every 6 months.  Yearly exams  See your health care team every year to talk about:  Any changes in your health.  Any medicines your care team has prescribed.  Preventive care, family planning, and ways to prevent chronic diseases.  Shots (vaccines)   HPV shots (up to age 26), if you've never had them before.  Hepatitis B shots (up to age 59), if you've never had them before.  COVID-19 shot: Get this shot when it's due.  Flu shot: Get a flu shot every year.  Tetanus shot: Get a tetanus shot every 10 years.  Pneumococcal, hepatitis A, and RSV shots: Ask your care team if you need these based on your risk.  Shingles shot (for age 50 and up)  General health tests  Diabetes screening:  Starting at age 35, Get screened for diabetes at least every 3  years.  If you are younger than age 35, ask your care team if you should be screened for diabetes.  Cholesterol test: At age 39, start having a cholesterol test every 5 years, or more often if advised.  Bone density scan (DEXA): At age 50, ask your care team if you should have this scan for osteoporosis (brittle bones).  Hepatitis C: Get tested at least once in your life.  STIs (sexually transmitted infections)  Before age 24: Ask your care team if you should be screened for STIs.  After age 24: Get screened for STIs if you're at risk. You are at risk for STIs (including HIV) if:  You are sexually active with more than one person.  You don't use condoms every time.  You or a partner was diagnosed with a sexually transmitted infection.  If you are at risk for HIV, ask about PrEP medicine to prevent HIV.  Get tested for HIV at least once in your life, whether you are at risk for HIV or not.  Cancer screening tests  Cervical cancer screening: If you have a cervix, begin getting regular cervical cancer screening tests starting at age 21.  Breast cancer scan (mammogram): If you've ever had breasts, begin having regular mammograms starting at age 40. This is a scan to check for breast cancer.  Colon cancer screening: It is important to start screening for colon cancer at age 45.  Have a colonoscopy test every 10 years (or more often if you're at risk) Or, ask your provider about stool tests like a FIT test every year or Cologuard test every 3 years.  To learn more about your testing options, visit:   .  For help making a decision, visit:   https://bit.ly/ai59714.  Prostate cancer screening test: If you have a prostate, ask your care team if a prostate cancer screening test (PSA) at age 55 is right for you.  Lung cancer screening: If you are a current or former smoker ages 50 to 80, ask your care team if ongoing lung cancer screenings are right for you.  For informational purposes only. Not to replace the advice of your  health care provider. Copyright   2023 [facility]. All rights reserved. Clinically reviewed by the [facility]. [employer] eto [id number] - REV 01/24.  Learning About Stress  What is stress?     Stress is your body's response to a hard situation. Your body can have a physical, emotional, or mental response. Stress is a fact of life for most people, and it affects everyone differently. What causes stress for you may not be stressful for someone else.  A lot of things can cause stress. You may feel stress when you go on a job interview, take a test, or run a race. This kind of short-term stress is normal and even useful. It can help you if you need to work hard or react quickly. For example, stress can help you finish an important job on time.  Long-term stress is caused by ongoing stressful situations or events. Examples of long-term stress include long-term health problems, ongoing problems at work, or conflicts in your family. Long-term stress can harm your health.  How does stress affect your health?  When you are stressed, your body responds as though you are in danger. It makes hormones that speed up your heart, make you breathe faster, and give you a burst of energy. This is called the fight-or-flight stress response. If the stress is over quickly, your body goes back to normal and no harm is done.  But if stress happens too often or lasts too long, it can have bad effects. Long-term stress can make you more likely to get sick, and it can make symptoms of some diseases worse. If you tense up when you are stressed, you may develop neck, shoulder, or low back pain. Stress is linked to high blood pressure and heart disease.  Stress also harms your emotional health. It can make you sesay, tense, or depressed. Your relationships may suffer, and you may not do well at work or school.  What can you do to manage stress?  You can try these things to help manage stress:   Do something  active. Exercise or activity can help reduce stress. Walking is a great way to get started. Even everyday activities such as housecleaning or yard work can help.  Try yoga or david chi. These techniques combine exercise and meditation. You may need some training at first to learn them.  Do something you enjoy. For example, listen to music or go to a movie. Practice your hobby or do volunteer work.  Meditate. This can help you relax, because you are not worrying about what happened before or what may happen in the future.  Do guided imagery. Imagine yourself in any setting that helps you feel calm. You can use online videos, books, or a teacher to guide you.  Do breathing exercises. For example:  From a standing position, bend forward from the waist with your knees slightly bent. Let your arms dangle close to the floor.  Breathe in slowly and deeply as you return to a standing position. Roll up slowly and lift your head last.  Hold your breath for just a few seconds in the standing position.  Breathe out slowly and bend forward from the waist.  Let your feelings out. Talk, laugh, cry, and express anger when you need to. Talking with supportive friends or family, a counselor, or a sugar leader about your feelings is a healthy way to relieve stress. Avoid discussing your feelings with people who make you feel worse.  Write. It may help to write about things that are bothering you. This helps you find out how much stress you feel and what is causing it. When you know this, you can find better ways to cope.  What can you do to prevent stress?  You might try some of these things to help prevent stress:  Manage your time. This helps you find time to do the things you want and need to do.  Get enough sleep. Your body recovers from the stresses of the day while you are sleeping.  Get support. Your family, friends, and community can make a difference in how you experience stress.  Limit your news feed. Avoid or limit time on  "social media or news that may make you feel stressed.  Do something active. Exercise or activity can help reduce stress. Walking is a great way to get started.  Where can you learn more?  Go to https://www.[employer].net/patiented  Enter N032 in the search box to learn more about \"Learning About Stress.\"  Current as of: October 24, 2024  Content Version: 14.4    8665-9744 [facility].   Care instructions adapted under license by your healthcare professional. If you have questions about a medical condition or this instruction, always ask your healthcare professional. [facility] disclaims any warranty or liability for your use of this information.       "

## 2025-04-02 NOTE — PROGRESS NOTES
Preventive Care Visit  [facility]  Katina Alicia MD, Family Medicine  Apr 2, 2025      Assessment & Plan     Routine physical examination  Encouraged healthy lifestyle habits including regular exercise, healthy eating habits, and adequate calcium and vitamin D intake. Will screen for diabetes with fasting glucose, will screen for dyslipidemia with fasting lipids.  Up-to-date with Pap smear screening, mammogram screening which was performed a week ago at [facility], up-to-date with colon cancer screening.  Advised consideration of PCV 20, COVID-vaccine, Shingrix.  She declines today as she is not yet recovered from her recent illness.  - Lipid panel reflex to direct LDL Fasting; Future  - Glucose; Future    Hypothyroidism due to Hashimoto's thyroiditis  Clinically euthyroid.  Single missed dose of levothyroxine yesterday.  Will check TSH today.  If mildly abnormal, will plan to recheck at a lab only visit before adjusting medication dose.  - TSH WITH FREE T4 REFLEX; Future  - levothyroxine (SYNTHROID/LEVOTHROID) 75 MCG tablet; Take 1 tablet (75 mcg) by mouth daily.    Nontoxic Multinodular Goiter  Asymptomatic.    Idiopathic osteoporosis  She continues to work with Dr. Multani.  Continues weightbearing activities, measures she was risk of falls, adequate calcium and vitamin D intake.  Will check for vitamin D sufficiency today.  - Vitamin D Deficiency; Future    Vitamin D Deficiency  - Vitamin D Deficiency; Future    Migraine without status migrainosus, not intractable, unspecified migraine type  Refill provided of rizatriptan.  - rizatriptan (MAXALT) 10 MG tablet; TAKE 1 TABLET BY MOUTH AT ONSET OF HEADACHE. MAY REPEAT IN 2 HOURS AS NEEDED. MAXIMUM OF 3 TABLETS PER 24 HOURS    History of basal cell carcinoma  Followed by dermatology.    Chronic otitis media of right ear with effusion  Dysfunction of right eustachian tube  Reviewed nature of condition.  Continue fluticasone  nasal spray.  As she has a flight this weekend, may use a single dose of Afrin to see if this helps as well.  Notify with persistence or worsening.    The longitudinal plan of care for the diagnosis(es)/condition(s) as documented were addressed during this visit. Due to the added complexity in care, I will continue to support Bee in the subsequent management and with ongoing continuity of care.            Counseling  Appropriate preventive services were addressed with this patient via screening, questionnaire, or discussion as appropriate for fall prevention, nutrition, physical activity, Tobacco-use cessation, social engagement, weight loss and cognition.  Checklist reviewing preventive services available has been given to the patient.  Reviewed patient's diet, addressing concerns and/or questions.   She is at risk for psychosocial distress and has been provided with information to reduce risk.           Radha Gamboa is a 64 year old, presenting for the following:  Physical (Fasting, no pap ) and Check right ear (Continues to feel plugged from being sick a month ago)           HPI  History of Present Illness-  Ear Congestion:  - Ear congestion persisting for a month, initially noted on March 10, 2025, during an urgent care visit.  - Clear fluid observed, no infection or inflammation reported.  - Symptoms include feeling of being underwater, fluctuating throughout the day.  - Previous flight experience exacerbated symptoms, with significant discomfort during descent.    Eustachian Tube Dysfunction:  - Symptoms include ear popping during flights.  - Concerns about upcoming flight to [place] this weekend.    Misc:  - Missed a dose of levothyroxine yesterday.  - Mild elevation in liver function noted in past, with bilirubin levels slightly high.  - History of gallstone detected via ultrasound years ago, no significant issues reported.           Advance Care Planning  Patient does not have a Health  Care Directive: Discussed advance care planning with patient; information given to patient to review.      3/30/2025   General Health   How would you rate your overall physical health? Excellent   Feel stress (tense, anxious, or unable to sleep) To some extent   (!) STRESS CONCERN      3/30/2025   Nutrition   Three or more servings of calcium each day? Yes   Diet: Regular (no restrictions)    Low salt    Low fat/cholesterol    Vegetarian/vegan   How many servings of fruit and vegetables per day? (!) 2-3   How many sweetened beverages each day? 0-1       Multiple values from one day are sorted in reverse-chronological order         3/30/2025   Exercise   Days per week of moderate/strenous exercise 5 days   Average minutes spent exercising at this level 30 min         3/30/2025   Social Factors   Frequency of gathering with friends or relatives Twice a week   Worry food won't last until get money to buy more No   Food not last or not have enough money for food? No   Do you have housing? (Housing is defined as stable permanent housing and does not include staying ouside in a car, in a tent, in an abandoned building, in an overnight shelter, or couch-surfing.) No   Are you worried about losing your housing? No   Lack of transportation? No   Unable to get utilities (heat,electricity)? No   Want help with housing or utility concern? No   (!) HOUSING CONCERN PRESENT      3/30/2025   Fall Risk   Fallen 2 or more times in the past year? No   Trouble with walking or balance? No          3/30/2025   Dental   Dentist two times every year? Yes           3/30/2024   TB Screening   Were you born outside of the [place]? No           Today's PHQ-2 Score:       4/2/2025     6:57 AM   PHQ-2 ( 1999 [employer])   Q1: Little interest or pleasure in doing things 0   Q2: Feeling down, depressed or hopeless 0   PHQ-2 Score 0    Q1: Little interest or pleasure in doing things Not at all   Q2: Feeling down, depressed or hopeless Not at all   PHQ-2  Score 0       Patient-reported           3/30/2025   Substance Use   Alcohol more than 3/day or more than 7/wk No   Do you use any other substances recreationally? No     Social History     Tobacco Use    Smoking status: Former    Smokeless tobacco: Never   Vaping Use    Vaping status: Never Used   Substance Use Topics    Alcohol use: Yes     Comment: Social    Drug use: Yes     Types: Marijuana           3/27/2023   LAST FHS-7 RESULTS   1st degree relative breast or ovarian cancer No   Any relative bilateral breast cancer Yes   Any male have breast cancer No   Any ONE woman have BOTH breast AND ovarian cancer Yes   Any woman with breast cancer before 50yrs No   2 or more relatives with breast AND/OR ovarian cancer No   2 or more relatives with breast AND/OR bowel cancer No                3/30/2025   STI Screening   New sexual partner(s) since last STI/HIV test? No     History of abnormal Pap smear: No - age 30- 64 PAP with HPV every 5 years recommended       ASCVD Risk   The 10-year ASCVD risk score (Kitty WISEMAN, et al., 2019) is: 2.7%    Values used to calculate the score:      Age: 64 years      Sex: Female      Is Non- : No      Diabetic: No      Tobacco smoker: No      Systolic Blood Pressure: 110 mmHg      Is BP treated: No      HDL Cholesterol: 90 mg/dL      Total Cholesterol: 212 mg/dL           Reviewed and updated as needed this visit by Provider                    Past Medical History:   Diagnosis Date    Arthritis     Cancer (H)     Skin cancers    Thyroid disease      History reviewed. No pertinent surgical history.  OB History    Para Term  AB Living   3 3 3 0 0 0   SAB IAB Ectopic Multiple Live Births   0 0 0 0 0      # Outcome Date GA Lbr Tj/2nd Weight Sex Type Anes PTL Lv   3 Term            2 Term            1 Term              Lab work is in process  Labs reviewed in EPIC  BP Readings from Last 3 Encounters:   25 110/60   03/10/25 106/63    11/18/24 119/63    Wt Readings from Last 3 Encounters:   04/02/25 58.5 kg (129 lb)   03/10/25 57.2 kg (126 lb)   11/18/24 58.5 kg (129 lb)                  Patient Active Problem List   Diagnosis    Nontoxic Multinodular Goiter    Hypothyroidism due to Hashimoto's thyroiditis    Vitamin D Deficiency    Gilbert's Syndrome    History of basal cell carcinoma    Migraines    Idiopathic osteoporosis     History reviewed. No pertinent surgical history.    Social History     Tobacco Use    Smoking status: Former    Smokeless tobacco: Never   Substance Use Topics    Alcohol use: Yes     Comment: Social     Family History   Problem Relation Age of Onset    Cancer Mother         skin cancer    Rheumatoid Arthritis Mother     Osteoporosis Mother     Cancer Father         Lymphoma    Crohn's Disease Father     Other Cancer Father         Lymphoma    Substance Abuse Father     Heart Disease Maternal Grandfather     Diabetes Maternal Grandfather     Heart Disease Paternal Grandmother     Diabetes Paternal Grandmother     Cancer Paternal Grandmother         oral/throat    Hypertension Paternal Grandmother     Breast Cancer Cousin          Current Outpatient Medications   Medication Sig Dispense Refill    Ascorbic Acid (VITAMIN C) 500 MG CAPS Take 1 tablet by mouth daily.      aspirin-acetaminophen-caffeine (EXCEDRIN MIGRAINE) 250-250-65 mg per tablet Take 1 tablet by mouth every 6 hours as needed.      calcium carbonate (OS-CRAIG) 600 mg (1,500 mg) tablet Take 600 mg by mouth daily.      cholecalciferol, vitamin D3, (VITAMIN D3) 2,000 unit cap Take 4,000 Units by mouth daily.      glucosamine-chondroitin 500-400 mg tablet Take 1 tablet by mouth 3 times daily.      levothyroxine (SYNTHROID/LEVOTHROID) 75 MCG tablet Take 1 tablet (75 mcg) by mouth daily. 90 tablet 4    magnesium 30 mg tablet Take 250 mg by mouth daily.      naproxen (NAPROSYN) 500 MG tablet Take 500 mg by mouth 2 times daily (with meals).      OMEGA-3/DHA/EPA/FISH  "OIL (FISH OIL-OMEGA-3 FATTY ACIDS) 300-1,000 mg capsule Take 1,200 mg by mouth daily.      rizatriptan (MAXALT) 10 MG tablet TAKE 1 TABLET BY MOUTH AT ONSET OF HEADACHE. MAY REPEAT IN 2 HOURS AS NEEDED. MAXIMUM OF 3 TABLETS PER 24 HOURS 12 tablet 4     Allergies   Allergen Reactions    Penicillins Unknown    Sulfa (Sulfonamide Antibiotics) [Sulfa Antibiotics] Rash     Recent Labs   Lab Test 04/02/24  1042 03/29/23  1243 03/21/22  1015 02/17/21  0900 02/05/20  1006 09/10/19  1637   * 128* 105 109   < >  --    HDL 90 80 79 83   < >  --    TRIG 73 73 92 83   < >  --    ALT  --  11  --   --   --  <9   CR 0.86 0.86  --  0.79  --  0.84   GFRESTIMATED 76 76  --  >60  --  >60   GFRESTBLACK  --   --   --  >60  --  >60   POTASSIUM 3.7 3.8  --  4.5  --  3.8   TSH 2.49 2.26 2.07 2.69   < > 1.28    < > = values in this interval not displayed.          Review of Systems  Constitutional, neuro, ENT, endocrine, pulmonary, cardiac, gastrointestinal, genitourinary, musculoskeletal, integument and psychiatric systems are negative, except as otherwise noted.     Objective    Exam  /60   Pulse 66   Temp 98  F (36.7  C) (Oral)   Resp 18   Ht 1.588 m (5' 2.5\")   Wt 58.5 kg (129 lb)   SpO2 99%   BMI 23.22 kg/m     Estimated body mass index is 23.22 kg/m  as calculated from the following:    Height as of this encounter: 1.588 m (5' 2.5\").    Weight as of this encounter: 58.5 kg (129 lb).    Physical Exam  GENERAL: alert and no distress  EYES: Eyes grossly normal to inspection, PERRL and conjunctivae and sclerae normal  HENT: ear canals normal, clear effusion right tympanic membrane, nose and mouth without ulcers or lesions  NECK: no adenopathy, no asymmetry, masses, or scars  RESP: lungs clear to auscultation - no rales, rhonchi or wheezes  CV: regular rate and rhythm, normal S1 S2, no S3 or S4, no murmur, click or rub, no peripheral edema  ABDOMEN: soft, nontender, no hepatosplenomegaly, no masses and bowel sounds " normal  MS: no gross musculoskeletal defects noted, no edema  SKIN: no suspicious lesions or rashes  NEURO: Normal strength and tone, mentation intact and speech normal  PSYCH: mentation appears normal, affect normal/bright        Signed Electronically by: Katina Alicia MD

## 2025-05-22 ENCOUNTER — OFFICE VISIT (OUTPATIENT)
Dept: FAMILY MEDICINE | Facility: CLINIC | Age: 65
End: 2025-05-22
Payer: COMMERCIAL

## 2025-05-22 VITALS
BODY MASS INDEX: 22.91 KG/M2 | DIASTOLIC BLOOD PRESSURE: 56 MMHG | SYSTOLIC BLOOD PRESSURE: 92 MMHG | HEIGHT: 63 IN | RESPIRATION RATE: 14 BRPM | OXYGEN SATURATION: 94 % | TEMPERATURE: 97.8 F | WEIGHT: 129.3 LBS | HEART RATE: 67 BPM

## 2025-05-22 DIAGNOSIS — Z85.828 HISTORY OF BASAL CELL CARCINOMA: ICD-10-CM

## 2025-05-22 DIAGNOSIS — H02.9 EYELID LESION: ICD-10-CM

## 2025-05-22 DIAGNOSIS — E06.3 HYPOTHYROIDISM DUE TO HASHIMOTO'S THYROIDITIS: ICD-10-CM

## 2025-05-22 DIAGNOSIS — Z01.818 PREOP GENERAL PHYSICAL EXAM: Primary | ICD-10-CM

## 2025-05-22 ASSESSMENT — PAIN SCALES - GENERAL: PAINLEVEL_OUTOF10: NO PAIN (0)

## 2025-05-22 NOTE — PATIENT INSTRUCTIONS
How to Take Your Medication Before Surgery  Preoperative Medication Instructions   Antiplatelet or Anticoagulation Medication Instructions   - We reviewed the medication list and the patient is not on an antiplatelet or anticoagulation medications.    Additional Medication Instructions  We reviewed the medication list and there are no chronic medications that need to be adjusted for this procedure.       Patient Education   Preparing for Your Surgery  For Adults  Getting started  In most cases, a nurse will call to review your health history and instructions. They will give you an arrival time based on your scheduled surgery time. Please be ready to share:  Your doctor's clinic name and phone number  Your medical, surgical, and anesthesia history  A list of allergies and sensitivities  A list of medicines, including herbal treatments and over-the-counter drugs  Whether the patient has a legal guardian (ask how to send us the papers in advance)  Note: You may not receive a call if you were seen at our PAC (Preoperative Assessment Center).  Please tell us if you're pregnant--or if there's any chance you might be pregnant. Some surgeries may injure a fetus (unborn baby), so they require a pregnancy test. Surgeries that are safe for a fetus don't always need a test, and you can choose whether to have one.   Preparing for surgery  Within 10 to 30 days of surgery: Have a pre-op exam (sometimes called an H&P, or History and Physical). This can be done at a clinic or pre-operative center.  If you're having a , you may not need this exam. Talk to your care team.  At your pre-op exam, talk to your care team about all medicines you take. (This includes CBD oil and any drugs, such as THC, marijuana, and other forms of cannabis.) If you need to stop any medicine before surgery, ask when to start taking it again.  This is for your safety. Many medicines and drugs can make you bleed too much during surgery. Some change  how well surgery (anesthesia) drugs work.  Call your insurance company to let them know you're having surgery. (If you don't have insurance, call [phone].)  Call your clinic if there's any change in your health. This includes a scrape or scratch near the surgery site, or any signs of a cold (sore throat, runny nose, cough, rash, fever).  Eating and drinking guidelines  For your safety: Unless your surgeon tells you otherwise, follow the guidelines below.  Eat and drink as normal until 8 hours before you arrive for surgery. After that, no food or milk. You can spit out gum when you arrive.  Drink clear liquids until 2 hours before you arrive. These are liquids you can see through, like water, Gatorade, and Propel Water. They also include plain black coffee and tea (no cream or milk).  No alcohol for 24 hours before you arrive. The night before surgery, stop any drinks that contain THC.  If your care team tells you to take medicine on the morning of surgery, it's okay to take it with a sip of water. No other medicines or drugs are allowed (including CBD oil)--follow your care team's instructions.  If you have questions the day of surgery, call your hospital or surgery center.   Preventing infection  Shower or bathe the night before and the morning of surgery. Follow the instructions your clinic gave you. (If no instructions, use regular soap.)  Don't shave or clip hair near your surgery site. We'll remove the hair if needed.  Don't smoke or vape the morning of surgery. No chewing tobacco for 6 hours before you arrive. A nicotine patch is okay. You may spit out nicotine gum when you arrive.  For some surgeries, the surgeon will tell you to fully quit smoking and nicotine.  We will make every effort to keep you safe from infection. We will:  Clean our hands often with soap and water (or an alcohol-based hand rub).  Clean the skin at your surgery site with a special soap that kills germs.  Give you a special gown to  keep you warm. (Cold raises the risk of infection.)  Wear hair covers, masks, gowns, and gloves during surgery.  Give antibiotic medicine, if prescribed. Not all surgeries need this medicine.  What to bring on the day of surgery  Photo ID and insurance card  Copy of your health care directive, if you have one  Glasses and hearing aids (bring cases)  You can't wear contacts during surgery  Inhaler and eye drops, if you use them (tell us about these when you arrive)  CPAP machine or breathing device, if you use them  A few personal items, if spending the night  If you have . . .  A pacemaker, ICD (cardiac defibrillator), or other implant: Bring the ID card.  An implanted stimulator: Bring the remote control.  A legal guardian: Bring a copy of the certified (court-stamped) guardianship papers.  Please remove any jewelry, including body piercings. Leave jewelry and other valuables at home.  If you're going home the day of surgery  You must have a responsible adult drive you home. They should stay with you overnight as well.  If you don't have someone to stay with you, and you aren't safe to go home alone, we may keep you overnight. Insurance often won't pay for this.  After surgery  If it's hard to control your pain or you need more pain medicine, please call your surgeon's office.  Questions?   If you have any questions for your care team, list them here:   ____________________________________________________________________________________________________________________________________________________________________________________________________________________________________________________________  For informational purposes only. Not to replace the advice of your health care provider. Copyright   2003, 2019 [facility]. All rights reserved. Clinically reviewed by Lee Hodges MD. [employer] [id number] - REV 08/24.

## 2025-05-22 NOTE — PROGRESS NOTES
Preoperative Evaluation  [facility]  [address]  [facility] [address]  Phone: [phone]  Fax: [phone]  Primary Provider: Katina Alicia MD  Pre-op Performing Provider: DANETTE Lacey CNP  May 22, 2025             5/22/2025   Surgical Information   What procedure is being done? biopsy on eyelid   Facility or Hospital where procedure/surgery will be performed: [facility]   Who is doing the procedure / surgery? Jaxon   Date of surgery / procedure: june 16   Time of surgery / procedure: am   Where do you plan to recover after surgery? at home with family     Fax number for surgical facility: [phone]    Assessment & Plan     The proposed surgical procedure is considered LOW risk.    Preop general physical exam  Eyelid lesion  64 year old female with PMH of hypothyroidism here for pre-op exam for upcoming biopsy of eyelid lesion. She is in good health today. She has tolerated anesthesia in the past. No EKG or labs warranted today given low risk surgery. OK to proceed with upcoming procedure.     Hypothyroidism due to Hashimoto's thyroiditis  Last TSH within normal limits. Continue levothyroxine.     History of basal cell carcinoma              - No identified additional risk factors other than previously addressed    Preoperative Medication Instructions  Antiplatelet or Anticoagulation Medication Instructions   - We reviewed the medication list and the patient is not on an antiplatelet or anticoagulation medications.    Additional Medication Instructions  We reviewed the medication list and there are no chronic medications that need to be adjusted for this procedure.    Recommendation  Approval given to proceed with proposed procedure, without further diagnostic evaluation.        Radha Gamboa is a 64 year old, presenting for the following:  Pre-Op Exam          5/22/2025    12:15 PM   Additional Questions   Roomed by Arminda      HPI:     Upcoming right eyelid procedure for biopsy of lesion that has been there for 6ish months. Lesion has come and gone, has history of skin cancer (27 times) so taking extra precaution and having it removed at [facility].     She has no chest pain, shortness of breath, fevers, cough.                   5/22/2025   Pre-Op Questionnaire   Have you ever had a heart attack or stroke? No   Have you ever had surgery on your heart or blood vessels, such as a stent placement, a coronary artery bypass, or surgery on an artery in your head, neck, heart, or legs? No   Do you have chest pain with activity? No   Do you have a history of heart failure? No   Do you currently have a cold, bronchitis or symptoms of other infection? No   Do you have a cough, shortness of breath, or wheezing? No   Do you or anyone in your family have previous history of blood clots? No   Do you or does anyone in your family have a serious bleeding problem such as prolonged bleeding following surgeries or cuts? No   Have you ever had problems with anemia or been told to take iron pills? (!) YES, history of low hgb after donating blood. Have remained stable the past few years.    Have you had any abnormal blood loss such as black, tarry or bloody stools, or abnormal vaginal bleeding? No   Have you ever had a blood transfusion? No   Are you willing to have a blood transfusion if it is medically needed before, during, or after your surgery? Yes   Have you or any of your relatives ever had problems with anesthesia? No   Do you have sleep apnea, excessive snoring or daytime drowsiness? No   Do you have any artifical heart valves or other implanted medical devices like a pacemaker, defibrillator, or continuous glucose monitor? No   Do you have artificial joints? No   Are you allergic to latex? No     Advance Care Planning    Discussed advance care planning with patient; however, patient declined at this time.    Preoperative Review of     reviewed - no record of controlled substances prescribed.      Status of Chronic Conditions:  See problem list for active medical problems.  Problems all longstanding and stable, except as noted/documented.  See ROS for pertinent symptoms related to these conditions.    Patient Active Problem List    Diagnosis Date Noted    Idiopathic osteoporosis 09/06/2022     Priority: Medium    Hypothyroidism due to Hashimoto's thyroiditis      Priority: Medium    History of basal cell carcinoma      Priority: Medium     Created by Conversion        Vitamin D Deficiency      Priority: Medium     Created by Conversion  Replacement Utility updated for latest IMO load        Gilbert's Syndrome      Priority: Medium     Created by Conversion  Replacement Utility updated for latest IMO load        Migraines 12/16/2015     Priority: Medium    Nontoxic Multinodular Goiter      Priority: Medium     Created by Conversion          Past Medical History:   Diagnosis Date    Arthritis     Cancer (H) 2003    Skin cancers    Thyroid disease 1996     No past surgical history on file.  Current Outpatient Medications   Medication Sig Dispense Refill    Ascorbic Acid (VITAMIN C) 500 MG CAPS Take 1 tablet by mouth daily.      aspirin-acetaminophen-caffeine (EXCEDRIN MIGRAINE) 250-250-65 mg per tablet Take 1 tablet by mouth every 6 hours as needed.      calcium carbonate (OS-CRAIG) 600 mg (1,500 mg) tablet Take 600 mg by mouth daily.      cholecalciferol, vitamin D3, (VITAMIN D3) 2,000 unit cap Take 4,000 Units by mouth daily.      glucosamine-chondroitin 500-400 mg tablet Take 1 tablet by mouth 3 times daily.      levothyroxine (SYNTHROID/LEVOTHROID) 75 MCG tablet Take 1 tablet (75 mcg) by mouth daily. 90 tablet 4    magnesium 30 mg tablet Take 250 mg by mouth daily.      naproxen (NAPROSYN) 500 MG tablet Take 500 mg by mouth 2 times daily (with meals).      OMEGA-3/DHA/EPA/FISH OIL (FISH OIL-OMEGA-3 FATTY ACIDS) 300-1,000 mg capsule Take  "1,200 mg by mouth daily.      rizatriptan (MAXALT) 10 MG tablet TAKE 1 TABLET BY MOUTH AT ONSET OF HEADACHE. MAY REPEAT IN 2 HOURS AS NEEDED. MAXIMUM OF 3 TABLETS PER 24 HOURS 12 tablet 4    TURMERIC PO Turmeric         Allergies   Allergen Reactions    Penicillins Unknown    Sulfa (Sulfonamide Antibiotics) [Sulfa Antibiotics] Rash        Social History     Tobacco Use    Smoking status: Former    Smokeless tobacco: Never   Substance Use Topics    Alcohol use: Yes     Comment: Social     Family History   Problem Relation Age of Onset    Cancer Mother         skin cancer    Rheumatoid Arthritis Mother     Osteoporosis Mother     Cancer Father         Lymphoma    Crohn's Disease Father     Other Cancer Father         Lymphoma    Substance Abuse Father     Heart Disease Maternal Grandfather     Diabetes Maternal Grandfather     Heart Disease Paternal Grandmother     Diabetes Paternal Grandmother     Cancer Paternal Grandmother         oral/throat    Hypertension Paternal Grandmother     Breast Cancer Cousin      History   Drug Use    Types: Marijuana             Review of Systems  Constitutional, neuro, ENT, endocrine, pulmonary, cardiac, gastrointestinal, genitourinary, musculoskeletal, integument and psychiatric systems are negative, except as otherwise noted.    Objective    BP 92/56 (BP Location: Left arm, Patient Position: Sitting, Cuff Size: Adult Regular)   Pulse 67   Temp 97.8  F (36.6  C) (Temporal)   Resp 14   Ht 1.588 m (5' 2.52\")   Wt 58.7 kg (129 lb 4.8 oz)   SpO2 94%   BMI 23.26 kg/m     Estimated body mass index is 23.26 kg/m  as calculated from the following:    Height as of this encounter: 1.588 m (5' 2.52\").    Weight as of this encounter: 58.7 kg (129 lb 4.8 oz).  Physical Exam  GENERAL: alert and no distress  NECK: no adenopathy, no asymmetry, masses, or scars  RESP: lungs clear to auscultation - no rales, rhonchi or wheezes  CV: regular rate and rhythm, normal S1 S2, no S3 or S4, no murmur, " "click or rub, no peripheral edema  MS: no gross musculoskeletal defects noted, no edema    No results for input(s): \"HGB\", \"PLT\", \"INR\", \"NA\", \"POTASSIUM\", \"CR\", \"A1C\" in the last 8760 hours.     Diagnostics  No labs were ordered during this visit.   No EKG required for low risk surgery (cataract, skin procedure, breast biopsy, etc).    Revised Cardiac Risk Index (RCRI)  The patient has the following serious cardiovascular risks for perioperative complications:   - No serious cardiac risks = 0 points     RCRI Interpretation: 0 points: Class I (very low risk - 0.4% complication rate)         Signed Electronically by: DANETTE Lacey CNP  A copy of this evaluation report is provided to the requesting physician.        "

## 2025-08-05 ENCOUNTER — TELEPHONE (OUTPATIENT)
Dept: INTERNAL MEDICINE | Facility: CLINIC | Age: 65
End: 2025-08-05
Payer: COMMERCIAL

## 2025-08-05 DIAGNOSIS — M81.8 IDIOPATHIC OSTEOPOROSIS: Primary | ICD-10-CM
